# Patient Record
Sex: MALE | Race: WHITE | NOT HISPANIC OR LATINO | Employment: FULL TIME | URBAN - METROPOLITAN AREA
[De-identification: names, ages, dates, MRNs, and addresses within clinical notes are randomized per-mention and may not be internally consistent; named-entity substitution may affect disease eponyms.]

---

## 2023-03-06 ENCOUNTER — TELEPHONE (OUTPATIENT)
Dept: PRIMARY CARE | Facility: CLINIC | Age: 53
End: 2023-03-06
Payer: COMMERCIAL

## 2023-03-06 DIAGNOSIS — F17.210 CIGARETTE NICOTINE DEPENDENCE WITHOUT COMPLICATION: Primary | ICD-10-CM

## 2023-03-07 RX ORDER — IBUPROFEN 200 MG
1 TABLET ORAL EVERY 24 HOURS
Qty: 30 PATCH | Refills: 0 | Status: SHIPPED | OUTPATIENT
Start: 2023-03-07 | End: 2023-04-19 | Stop reason: SDUPTHER

## 2023-04-19 DIAGNOSIS — F17.210 CIGARETTE NICOTINE DEPENDENCE WITHOUT COMPLICATION: ICD-10-CM

## 2023-04-21 RX ORDER — IBUPROFEN 200 MG
1 TABLET ORAL EVERY 24 HOURS
Qty: 30 PATCH | Refills: 0 | Status: SHIPPED | OUTPATIENT
Start: 2023-04-21 | End: 2023-05-30 | Stop reason: SDUPTHER

## 2023-05-30 DIAGNOSIS — F17.210 CIGARETTE NICOTINE DEPENDENCE WITHOUT COMPLICATION: ICD-10-CM

## 2023-05-30 RX ORDER — IBUPROFEN 200 MG
1 TABLET ORAL EVERY 24 HOURS
Qty: 30 PATCH | Refills: 0 | Status: SHIPPED | OUTPATIENT
Start: 2023-05-30 | End: 2023-07-11 | Stop reason: ALTCHOICE

## 2023-06-27 DIAGNOSIS — R91.8 PULMONARY NODULES: Primary | ICD-10-CM

## 2023-07-10 PROBLEM — I10 HYPERTENSION: Status: ACTIVE | Noted: 2023-07-10

## 2023-07-10 PROBLEM — N40.0 ENLARGED PROSTATE WITHOUT LOWER URINARY TRACT SYMPTOMS (LUTS): Status: ACTIVE | Noted: 2023-07-10

## 2023-07-10 PROBLEM — E78.6 LOW HDL (UNDER 40): Status: ACTIVE | Noted: 2023-07-10

## 2023-07-10 PROBLEM — G81.94 HEMIPARESIS, LEFT (MULTI): Status: ACTIVE | Noted: 2023-07-10

## 2023-07-10 PROBLEM — R06.2 WHEEZING: Status: ACTIVE | Noted: 2023-07-10

## 2023-07-10 PROBLEM — E78.5 HYPERLIPIDEMIA: Status: ACTIVE | Noted: 2023-07-10

## 2023-07-10 PROBLEM — I48.91 ATRIAL FIBRILLATION (MULTI): Status: ACTIVE | Noted: 2023-07-10

## 2023-07-10 PROBLEM — N52.9 MALE ERECTILE DISORDER OF ORGANIC ORIGIN: Status: ACTIVE | Noted: 2023-07-10

## 2023-07-10 PROBLEM — I63.9: Status: ACTIVE | Noted: 2023-07-10

## 2023-07-10 PROBLEM — I63.9 CVA (CEREBRAL VASCULAR ACCIDENT) (MULTI): Status: ACTIVE | Noted: 2023-07-10

## 2023-07-10 PROBLEM — R26.89 IMBALANCE: Status: ACTIVE | Noted: 2023-07-10

## 2023-07-10 PROBLEM — F32.A DEPRESSION: Status: ACTIVE | Noted: 2023-07-10

## 2023-07-10 RX ORDER — ATORVASTATIN CALCIUM 20 MG/1
1 TABLET, FILM COATED ORAL DAILY
COMMUNITY
Start: 2021-06-21 | End: 2023-07-11 | Stop reason: ALTCHOICE

## 2023-07-10 RX ORDER — ATORVASTATIN CALCIUM 40 MG/1
40 TABLET, FILM COATED ORAL DAILY
COMMUNITY
End: 2023-07-11

## 2023-07-10 RX ORDER — LISINOPRIL 5 MG/1
5 TABLET ORAL DAILY
COMMUNITY
End: 2023-07-11 | Stop reason: ALTCHOICE

## 2023-07-10 RX ORDER — TADALAFIL 20 MG/1
TABLET ORAL
COMMUNITY
Start: 2022-05-31 | End: 2024-04-02 | Stop reason: ALTCHOICE

## 2023-07-10 RX ORDER — LISINOPRIL 10 MG/1
10 TABLET ORAL DAILY
COMMUNITY
End: 2023-07-11 | Stop reason: ALTCHOICE

## 2023-07-10 RX ORDER — FOLIC ACID 1 MG/1
1 TABLET ORAL DAILY
COMMUNITY
End: 2024-02-12 | Stop reason: SDUPTHER

## 2023-07-10 RX ORDER — METOPROLOL SUCCINATE 50 MG/1
50 TABLET, EXTENDED RELEASE ORAL DAILY
COMMUNITY

## 2023-07-10 RX ORDER — APIXABAN 5 MG/1
5 TABLET, FILM COATED ORAL 2 TIMES DAILY
COMMUNITY
End: 2024-04-02 | Stop reason: SDUPTHER

## 2023-07-10 RX ORDER — ASPIRIN 81 MG/1
81 TABLET ORAL DAILY
COMMUNITY

## 2023-07-10 RX ORDER — LANOLIN ALCOHOL/MO/W.PET/CERES
1 CREAM (GRAM) TOPICAL DAILY
COMMUNITY
Start: 2021-06-14 | End: 2023-10-11 | Stop reason: ALTCHOICE

## 2023-07-10 RX ORDER — DM/P-EPHED/ACETAMINOPH/DOXYLAM 30-7.5/3
LIQUID (ML) ORAL
COMMUNITY
Start: 2022-03-23 | End: 2023-10-11 | Stop reason: ALTCHOICE

## 2023-07-11 ENCOUNTER — OFFICE VISIT (OUTPATIENT)
Dept: PRIMARY CARE | Facility: CLINIC | Age: 53
End: 2023-07-11
Payer: COMMERCIAL

## 2023-07-11 VITALS
HEART RATE: 82 BPM | TEMPERATURE: 97 F | WEIGHT: 240 LBS | DIASTOLIC BLOOD PRESSURE: 65 MMHG | SYSTOLIC BLOOD PRESSURE: 148 MMHG | BODY MASS INDEX: 29.22 KG/M2 | HEIGHT: 76 IN

## 2023-07-11 DIAGNOSIS — R91.8 PULMONARY NODULES: ICD-10-CM

## 2023-07-11 DIAGNOSIS — F17.219 CIGARETTE NICOTINE DEPENDENCE WITH NICOTINE-INDUCED DISORDER: ICD-10-CM

## 2023-07-11 DIAGNOSIS — I48.91 ATRIAL FIBRILLATION, UNSPECIFIED TYPE (MULTI): ICD-10-CM

## 2023-07-11 DIAGNOSIS — I10 HYPERTENSION, UNSPECIFIED TYPE: Primary | ICD-10-CM

## 2023-07-11 DIAGNOSIS — E78.5 HYPERLIPIDEMIA, UNSPECIFIED HYPERLIPIDEMIA TYPE: ICD-10-CM

## 2023-07-11 DIAGNOSIS — M54.12 CERVICAL RADICULOPATHY: ICD-10-CM

## 2023-07-11 PROCEDURE — 99214 OFFICE O/P EST MOD 30 MIN: CPT | Performed by: PEDIATRICS

## 2023-07-11 PROCEDURE — 90471 IMMUNIZATION ADMIN: CPT | Performed by: PEDIATRICS

## 2023-07-11 PROCEDURE — 3077F SYST BP >= 140 MM HG: CPT | Performed by: PEDIATRICS

## 2023-07-11 PROCEDURE — 90750 HZV VACC RECOMBINANT IM: CPT | Performed by: PEDIATRICS

## 2023-07-11 PROCEDURE — 3078F DIAST BP <80 MM HG: CPT | Performed by: PEDIATRICS

## 2023-07-11 PROCEDURE — 1036F TOBACCO NON-USER: CPT | Performed by: PEDIATRICS

## 2023-07-11 RX ORDER — ROSUVASTATIN CALCIUM 40 MG/1
40 TABLET, COATED ORAL DAILY
COMMUNITY
Start: 2023-06-27

## 2023-07-11 RX ORDER — NICOTINE 7MG/24HR
1 PATCH, TRANSDERMAL 24 HOURS TRANSDERMAL EVERY 24 HOURS
Qty: 30 PATCH | Refills: 0 | Status: SHIPPED | OUTPATIENT
Start: 2023-07-11 | End: 2023-08-15 | Stop reason: SDUPTHER

## 2023-07-11 RX ORDER — LISINOPRIL 20 MG/1
20 TABLET ORAL DAILY
Qty: 30 TABLET | Refills: 5 | Status: SHIPPED | OUTPATIENT
Start: 2023-07-11 | End: 2024-01-17 | Stop reason: SDUPTHER

## 2023-07-11 NOTE — PROGRESS NOTES
"Subjective   Patient ID: Adi Tavera is a 53 y.o. male who presents for check up.    HPI   Patient stopped smoking January.  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 Currently he has right upper back pain/shoulder pain and tingling down right arm.  PSA ok  Colonoscopy due 2024  Review of Systems    Objective   /65   Pulse 82   Temp 36.1 °C (97 °F)   Ht 1.93 m (6' 4\")   Wt 109 kg (240 lb)   BMI 29.21 kg/m²     Physical Exam  HEENT neg  Lungs clear  Heart reg  Abd soft and nontender  No edema  Strength ok  ROM ok  Assessment/Plan   Problem List Items Addressed This Visit       Pulmonary nodules     Nodules noted; repeat CT next year         Atrial fibrillation (CMS/HCC)     Continues on Metoprolol and Eliquis         Relevant Medications    metoprolol succinate XL (Toprol-XL) 50 mg 24 hr tablet    tadalafil 20 mg tablet    Hyperlipidemia     Continue Rosuvastatin 40         Hypertension - Primary     Increase Lisinopril to 40         Cervical radiculopathy   Shingrix today       "

## 2023-08-15 DIAGNOSIS — F17.219 CIGARETTE NICOTINE DEPENDENCE WITH NICOTINE-INDUCED DISORDER: ICD-10-CM

## 2023-08-15 RX ORDER — NICOTINE 7MG/24HR
1 PATCH, TRANSDERMAL 24 HOURS TRANSDERMAL EVERY 24 HOURS
Qty: 30 PATCH | Refills: 1 | Status: SHIPPED | OUTPATIENT
Start: 2023-08-15 | End: 2024-04-02

## 2023-10-11 ENCOUNTER — OFFICE VISIT (OUTPATIENT)
Dept: PRIMARY CARE | Facility: CLINIC | Age: 53
End: 2023-10-11
Payer: COMMERCIAL

## 2023-10-11 VITALS
DIASTOLIC BLOOD PRESSURE: 77 MMHG | SYSTOLIC BLOOD PRESSURE: 124 MMHG | HEART RATE: 74 BPM | BODY MASS INDEX: 29.1 KG/M2 | HEIGHT: 76 IN | WEIGHT: 239 LBS | TEMPERATURE: 96.9 F | RESPIRATION RATE: 17 BRPM | OXYGEN SATURATION: 94 %

## 2023-10-11 DIAGNOSIS — I63.9 CEREBROVASCULAR ACCIDENT (CVA), UNSPECIFIED MECHANISM (MULTI): ICD-10-CM

## 2023-10-11 DIAGNOSIS — E78.5 HYPERLIPIDEMIA, UNSPECIFIED HYPERLIPIDEMIA TYPE: ICD-10-CM

## 2023-10-11 DIAGNOSIS — I10 HYPERTENSION, UNSPECIFIED TYPE: Primary | ICD-10-CM

## 2023-10-11 PROBLEM — R26.89 IMBALANCE: Status: RESOLVED | Noted: 2023-07-10 | Resolved: 2023-10-11

## 2023-10-11 PROCEDURE — 1036F TOBACCO NON-USER: CPT | Performed by: PEDIATRICS

## 2023-10-11 PROCEDURE — 99213 OFFICE O/P EST LOW 20 MIN: CPT | Performed by: PEDIATRICS

## 2023-10-11 PROCEDURE — 3074F SYST BP LT 130 MM HG: CPT | Performed by: PEDIATRICS

## 2023-10-11 PROCEDURE — 3078F DIAST BP <80 MM HG: CPT | Performed by: PEDIATRICS

## 2023-10-11 RX ORDER — SULFAMETHOXAZOLE AND TRIMETHOPRIM 800; 160 MG/1; MG/1
1 TABLET ORAL 2 TIMES DAILY
COMMUNITY
Start: 2023-09-26 | End: 2023-10-06

## 2023-10-11 RX ORDER — MUPIROCIN 20 MG/G
OINTMENT TOPICAL
COMMUNITY
Start: 2023-09-26 | End: 2023-10-11 | Stop reason: ALTCHOICE

## 2023-10-11 ASSESSMENT — PATIENT HEALTH QUESTIONNAIRE - PHQ9
2. FEELING DOWN, DEPRESSED OR HOPELESS: NOT AT ALL
SUM OF ALL RESPONSES TO PHQ9 QUESTIONS 1 AND 2: 0
1. LITTLE INTEREST OR PLEASURE IN DOING THINGS: NOT AT ALL

## 2023-10-11 ASSESSMENT — ENCOUNTER SYMPTOMS: HYPERTENSION: 1

## 2023-10-11 ASSESSMENT — PAIN SCALES - GENERAL: PAINLEVEL: 0-NO PAIN

## 2023-10-11 NOTE — PATIENT INSTRUCTIONS
Egg white omelet or cereal for breakfast;  Avoid fried foods unless using air fryer;  Limit starches  Bike half hour daily; exercise videos Sona Roman  Congratulations on quitting smoking and drinking.  See Dr Aaron  See me in 6 months

## 2023-10-11 NOTE — PROGRESS NOTES
"Subjective   Patient ID: Roverto Tavera is a 53 y.o. male who presents for Hypertension and Hyperlipidemia.    Hypertension    Hyperlipidemia       Patient lives alone. Takes son out to dinner once a week;  Breakfast: sausage and gravy;  Lunch: left overs; not too much lunch meat  Dinner: steak, or pork, or fried chicken; veggies; trying to limit bread  Fast food twice a week;   Snack: rarely  Colonoscopy due 2024  No smoking or alcohol  Review of Systems    Objective   /77 (BP Location: Right arm, Patient Position: Sitting, BP Cuff Size: Adult)   Pulse 74   Temp 36.1 °C (96.9 °F) (Temporal)   Resp 17   Ht 1.93 m (6' 4\")   Wt 108 kg (239 lb)   SpO2 94%   BMI 29.09 kg/m²     Physical Exam  Vitals reviewed.   Constitutional:       General: He is not in acute distress.  HENT:      Head: Normocephalic.      Right Ear: Tympanic membrane normal.      Left Ear: Tympanic membrane normal.      Nose: Nose normal.      Mouth/Throat:      Pharynx: Oropharynx is clear.   Cardiovascular:      Rate and Rhythm: Normal rate and regular rhythm.      Heart sounds: Normal heart sounds.   Pulmonary:      Breath sounds: Normal breath sounds.   Abdominal:      Palpations: Abdomen is soft.      Tenderness: There is no abdominal tenderness.   Musculoskeletal:         General: No tenderness.   Skin:     Findings: No rash.   Neurological:      General: No focal deficit present.      Mental Status: He is alert.   Psychiatric:         Mood and Affect: Mood normal.         Assessment/Plan   Problem List Items Addressed This Visit       CVA (cerebral vascular accident) (CMS/[facility])    Current Assessment & Plan     Feels he has pretty much regained his left sided strength         Hyperlipidemia    Current Assessment & Plan     LDL 86 in January; continue Rosuva         Hypertension - Primary    Current Assessment & Plan     Continue lisinopril 20                "

## 2023-10-12 PROBLEM — G81.94 HEMIPARESIS, LEFT (MULTI): Status: RESOLVED | Noted: 2023-07-10 | Resolved: 2023-10-12

## 2024-01-17 DIAGNOSIS — I10 HYPERTENSION, UNSPECIFIED TYPE: ICD-10-CM

## 2024-01-18 RX ORDER — LISINOPRIL 20 MG/1
20 TABLET ORAL DAILY
Qty: 90 TABLET | Refills: 0 | Status: SHIPPED | OUTPATIENT
Start: 2024-01-18 | End: 2024-04-17 | Stop reason: SDUPTHER

## 2024-02-12 DIAGNOSIS — Z00.00 HEALTH CARE MAINTENANCE: Primary | ICD-10-CM

## 2024-02-13 RX ORDER — FOLIC ACID 1 MG/1
1 TABLET ORAL DAILY
Qty: 90 TABLET | Refills: 1 | Status: SHIPPED | OUTPATIENT
Start: 2024-02-13

## 2024-04-02 ENCOUNTER — SPECIALTY PHARMACY (OUTPATIENT)
Dept: PHARMACY | Facility: CLINIC | Age: 54
End: 2024-04-02

## 2024-04-02 ENCOUNTER — OFFICE VISIT (OUTPATIENT)
Dept: CARDIOLOGY | Facility: CLINIC | Age: 54
End: 2024-04-02
Payer: COMMERCIAL

## 2024-04-02 VITALS
SYSTOLIC BLOOD PRESSURE: 157 MMHG | WEIGHT: 251.7 LBS | OXYGEN SATURATION: 96 % | DIASTOLIC BLOOD PRESSURE: 78 MMHG | BODY MASS INDEX: 30.65 KG/M2 | HEIGHT: 76 IN | HEART RATE: 76 BPM

## 2024-04-02 DIAGNOSIS — I48.91 ATRIAL FIBRILLATION, UNSPECIFIED TYPE (MULTI): ICD-10-CM

## 2024-04-02 DIAGNOSIS — I48.91 ATRIAL FIBRILLATION, UNSPECIFIED TYPE (MULTI): Primary | ICD-10-CM

## 2024-04-02 PROCEDURE — 99214 OFFICE O/P EST MOD 30 MIN: CPT | Performed by: NURSE PRACTITIONER

## 2024-04-02 PROCEDURE — 3078F DIAST BP <80 MM HG: CPT | Performed by: NURSE PRACTITIONER

## 2024-04-02 PROCEDURE — 1036F TOBACCO NON-USER: CPT | Performed by: NURSE PRACTITIONER

## 2024-04-02 PROCEDURE — 3077F SYST BP >= 140 MM HG: CPT | Performed by: NURSE PRACTITIONER

## 2024-04-02 RX ORDER — APIXABAN 5 MG/1
5 TABLET, FILM COATED ORAL 2 TIMES DAILY
Qty: 180 TABLET | Refills: 3 | Status: SHIPPED | OUTPATIENT
Start: 2024-04-02 | End: 2024-05-13 | Stop reason: SDUPTHER

## 2024-04-02 RX ORDER — APIXABAN 5 MG/1
5 TABLET, FILM COATED ORAL 2 TIMES DAILY
Qty: 180 TABLET | Refills: 1 | Status: SHIPPED | OUTPATIENT
Start: 2024-04-02 | End: 2024-04-02 | Stop reason: SDUPTHER

## 2024-04-02 ASSESSMENT — ENCOUNTER SYMPTOMS
DEPRESSION: 0
LOSS OF SENSATION IN FEET: 0
OCCASIONAL FEELINGS OF UNSTEADINESS: 0

## 2024-04-02 NOTE — PROGRESS NOTES
"Chief Complaint:   Follow-up    History Of Present Illness:    .Mr Tavera returns in follow up.  Denies chest pain, sob, palpitations or pedal edema.  Dr Carlin will draw labs. He has what sounds like a loop recorder, but he had to cancel the service because it was \"out of network\".  Would like to investigate resuming service, so will have him see EP.           Last Recorded Vitals:  Blood pressure 157/78, pulse 76, height 1.93 m (6' 4\"), weight 114 kg (251 lb 11.2 oz), SpO2 96 %.     Past Medical History:  Past Medical History:   Diagnosis Date    Abnormal findings on diagnostic imaging of other specified body structures 05/22/2021    Abnormal chest xray    Diverticulitis of intestine, part unspecified, without perforation or abscess without bleeding 03/23/2021    Acute diverticulitis    Malignant neoplasm of unspecified kidney, except renal pelvis (CMS/HCC) 05/29/2013    Malignant neoplasm of kidney    Personal history of other diseases of the respiratory system 05/22/2021    History of interstitial lung disease    Personal history of other malignant neoplasm of kidney 08/18/2017    History of renal cell carcinoma    Personal history of other mental and behavioral disorders 07/12/2021    History of anxiety        Past Surgical History:  Past Surgical History:   Procedure Laterality Date    FOOT SURGERY  05/30/2013    Foot Surgery    KIDNEY SURGERY  05/30/2013    Kidney Surgery    NECK SURGERY  05/30/2013    Neck Surgery       Social History:  Social History     Socioeconomic History    Marital status: Single     Spouse name: None    Number of children: None    Years of education: None    Highest education level: None   Occupational History    None   Tobacco Use    Smoking status: Former     Types: Cigarettes    Smokeless tobacco: Never   Substance and Sexual Activity    Alcohol use: Not Currently    Drug use: Never    Sexual activity: None   Other Topics Concern    None   Social History Narrative    None     Social " Determinants of Health     Financial Resource Strain: Not on file   Food Insecurity: Not on file   Transportation Needs: Not on file   Physical Activity: Not on file   Stress: Not on file   Social Connections: Not on file   Intimate Partner Violence: Not on file   Housing Stability: Not on file       Family History:  No family history on file.      Allergies:  Patient has no known allergies.    Outpatient Medications:  Current Outpatient Medications   Medication Sig Dispense Refill    aspirin 81 mg EC tablet Take 1 tablet (81 mg) by mouth once daily.      Eliquis 5 mg tablet Take 1 tablet (5 mg) by mouth 2 times a day.      folic acid (Folvite) 1 mg tablet Take 1 tablet (1 mg) by mouth once daily. 90 tablet 1    lisinopril 20 mg tablet Take 1 tablet (20 mg) by mouth once daily. 90 tablet 0    metoprolol succinate XL (Toprol-XL) 50 mg 24 hr tablet Take 1 tablet (50 mg) by mouth once daily.      rosuvastatin (Crestor) 40 mg tablet Take 1 tablet (40 mg) by mouth once daily.       No current facility-administered medications for this visit.        Physical Exam:  Physical Exam    ROS:  ROS       Last Labs:  CBC -  Lab Results   Component Value Date    WBC 10.1 01/30/2023    HGB 15.4 01/30/2023    HCT 45.6 01/30/2023    MCV 97 01/30/2023     01/30/2023       CMP -  Lab Results   Component Value Date    CALCIUM 9.9 01/30/2023    PROT 7.5 06/17/2021    ALBUMIN 4.6 06/17/2021    AST 20 06/17/2021    ALT 24 01/30/2023    ALKPHOS 119 06/17/2021    BILITOT 0.6 06/17/2021       LIPID PANEL -   Lab Results   Component Value Date    CHOL 180 01/30/2023    TRIG 220 (H) 01/30/2023    HDL 50.5 01/30/2023    CHHDL 3.6 01/30/2023    LDLF 86 01/30/2023    VLDL 44 (H) 01/30/2023    NHDL 130 01/30/2023       RENAL FUNCTION PANEL -   Lab Results   Component Value Date    GLUCOSE 102 (H) 01/30/2023     01/30/2023    K 4.8 01/30/2023     01/30/2023    CO2 24 01/30/2023    ANIONGAP 15 01/30/2023    BUN 20 01/30/2023     CREATININE 1.00 01/30/2023    GFRMALE 89 01/30/2023    CALCIUM 9.9 01/30/2023    ALBUMIN 4.6 06/17/2021        Lab Results   Component Value Date    HGBA1C 5.3 06/07/2021         Assessment/Plan   Problem List Items Addressed This Visit    None  Assessment:     1. Status post right hemispheric CVA. The patient is a 51-year-old white male with a history of longstanding hypertension hyperlipidemia chronic smoking of 1-1-1/2 packs/day with remote substance abuse including marijuana and cocaine. He also has a history of ongoing alcohol abuse. The patient was actually admitted to [facility] and 5/2021 for detoxification. The patient subsequently was referred to a detoxification facility at [facility]. The patient was in the midst of the program when he developed a right hemispheric CVA manifested by a left facial droop slurred speech and the paresthesias of the left-sided extremities that occurred on 6/6/2021. The patient was admitted for a period of 1 week to the [facility]. Reportedly he was identified as having paroxysmal atrial fibrillation. Loop recorder was implanted and he was started on Eliquis 5 mg twice daily. After his 1 week hospitalization he was sent to the [facility] for rehabilitation and ultimately was released on 6/21/2021. The patient presents for initial neurologic cardiology follow-up. His EKG today demonstrates sinus rhythm with moderate voltage criteria for left we will attempt to obtain the records from the [facility] the patient is presently taking clonidine only 0.1 mg daily. And will be discontinued in favor of Toprol-XL 50 mg daily. He will also be advised to restart lisinopril 10 mg daily for hypertension and will be aware of the fact that the patient solitary kidney. The patient will be reminded to restart previously prescribed atorvastatin 40 mg daily. Apparently patient has a loop recorder without current monitoring.  Will refer to EP to re  institute monitoring.     2. Paroxysmal atrial fibrillation. See discussion above. Obtain records from [facility].     3. Eliquis anticoagulation.     4. Hypertension. Blood pressure is acceptable today. We will continue observation now on lisinopril 10 mg daily Toprol-XL 50 mg daily     5. Hyperlipidemia. Was on atorvastatin 40 mg daily. Now on rosuvastatin 40 mg daily and pcp will draw fasting labs.     6. History of chronic smoking. Quit 3 months ago.      7 history of alcohol abuse.     8. Status post left-sided nephrectomy for renal cell carcinoma 2004.     9. CAD. Coronary artery calcium score done 06/2023 was 162.08.  Stress echo done 07/2023  Summary:  1. The resting ejection fraction was estimated at 55 to 60% with a peak exercise ejection fraction estimated at 60 to 65%.  2. No clinical, electrocardiographic or echocardiographic evidence for ischemia at a maximal workload.  3. Submaximal level of stress achieved.              Callie Ortega, APRN-CNP

## 2024-04-11 ENCOUNTER — OFFICE VISIT (OUTPATIENT)
Dept: PRIMARY CARE | Facility: CLINIC | Age: 54
End: 2024-04-11
Payer: COMMERCIAL

## 2024-04-11 VITALS
HEART RATE: 82 BPM | BODY MASS INDEX: 30.69 KG/M2 | HEIGHT: 76 IN | DIASTOLIC BLOOD PRESSURE: 70 MMHG | OXYGEN SATURATION: 95 % | WEIGHT: 252 LBS | TEMPERATURE: 96.9 F | SYSTOLIC BLOOD PRESSURE: 111 MMHG

## 2024-04-11 DIAGNOSIS — E78.5 HYPERLIPIDEMIA, UNSPECIFIED HYPERLIPIDEMIA TYPE: ICD-10-CM

## 2024-04-11 DIAGNOSIS — Z12.11 COLON CANCER SCREENING: ICD-10-CM

## 2024-04-11 DIAGNOSIS — I48.0 PAROXYSMAL ATRIAL FIBRILLATION (MULTI): ICD-10-CM

## 2024-04-11 DIAGNOSIS — Z12.5 SCREENING PSA (PROSTATE SPECIFIC ANTIGEN): ICD-10-CM

## 2024-04-11 DIAGNOSIS — I10 HYPERTENSION, UNSPECIFIED TYPE: ICD-10-CM

## 2024-04-11 DIAGNOSIS — N40.0 ENLARGED PROSTATE WITHOUT LOWER URINARY TRACT SYMPTOMS (LUTS): ICD-10-CM

## 2024-04-11 DIAGNOSIS — I48.91 ATRIAL FIBRILLATION, UNSPECIFIED TYPE (MULTI): ICD-10-CM

## 2024-04-11 DIAGNOSIS — I63.9 CEREBROVASCULAR ACCIDENT (CVA), UNSPECIFIED MECHANISM (MULTI): ICD-10-CM

## 2024-04-11 DIAGNOSIS — Z00.00 WELL ADULT EXAM: Primary | ICD-10-CM

## 2024-04-11 DIAGNOSIS — Z87.891 FORMER SMOKER: ICD-10-CM

## 2024-04-11 DIAGNOSIS — E66.09 CLASS 1 OBESITY DUE TO EXCESS CALORIES WITH SERIOUS COMORBIDITY AND BODY MASS INDEX (BMI) OF 30.0 TO 30.9 IN ADULT: ICD-10-CM

## 2024-04-11 PROBLEM — E66.811 CLASS 1 OBESITY DUE TO EXCESS CALORIES WITH SERIOUS COMORBIDITY AND BODY MASS INDEX (BMI) OF 30.0 TO 30.9 IN ADULT: Status: ACTIVE | Noted: 2024-04-11

## 2024-04-11 PROBLEM — M54.12 CERVICAL RADICULOPATHY: Status: RESOLVED | Noted: 2023-07-11 | Resolved: 2024-04-11

## 2024-04-11 PROCEDURE — 3078F DIAST BP <80 MM HG: CPT | Performed by: PEDIATRICS

## 2024-04-11 PROCEDURE — 3008F BODY MASS INDEX DOCD: CPT | Performed by: PEDIATRICS

## 2024-04-11 PROCEDURE — 99396 PREV VISIT EST AGE 40-64: CPT | Performed by: PEDIATRICS

## 2024-04-11 PROCEDURE — 3074F SYST BP LT 130 MM HG: CPT | Performed by: PEDIATRICS

## 2024-04-11 NOTE — PROGRESS NOTES
"Subjective   Patient ID: Roverto Tavera is a 54 y.o. male who presents for check up.    HPI   Patient has been feeling well, no complaints.  Patient notes that he quit smoking 1 year ago using a nicotine patch. He smoked for 40 years, about a 1.5 pack a day.  Patient stopped drinking EtOH for a few years.  Patient denies any chest pain, SOB, or palpitations.  Saw cardiologist 10 days ago.  Works as a , walks a significant amount.    Last colonoscopy was 2021. Polyps were removed and found to be benign. Needs repeat this year  Prostate antigen was January 2023, normal.  Cardiac stress test was in July 2023, submaximal level stress achieved.    Denies aches and pains    Review of Systems    Objective   /70   Pulse 82   Temp 36.1 °C (96.9 °F)   Ht 1.93 m (6' 4\")   Wt 114 kg (252 lb)   SpO2 95%   BMI 30.67 kg/m²     Physical Exam  General: Well-appearing, NAD  Cardiovascular: RRR  Lungs: CTA bilaterally, normal breath sounds  Abdomen: Soft nontender; small umbilical hernia      Assessment/Plan   Problem List Items Addressed This Visit             ICD-10-CM    Paroxysmal atrial fibrillation (Multi) I48.0     Currently taking metoprolol. S/p cardiac implant.         CVA (cerebral vascular accident) (Multi) I63.9     Stroke was around 3 years ago, currently taking eliquis and aspirin with well-controlled blood pressure.         Enlarged prostate without lower urinary tract symptoms (luts) N40.0    Hyperlipidemia E78.5     Taking rosuvastatin. Last lipid panel was January 2023, total cholesterol and LDL were good. Triglycerides were 220.         Relevant Orders    Lipid Panel    Hypertension I10     Well controlled on lisinopril.         Class 1 obesity due to excess calories with serious comorbidity and body mass index (BMI) of 30.0 to 30.9 in adult E66.09, Z68.30     Does not exercise  Breakfast bowl for breakfast--potato and cheese  Lunch--[employer] TV dinner with beef and " pasta  Dinner--chicken, salad  Snack: not much  Drinks water         Relevant Orders    Hemoglobin A1C    Comprehensive Metabolic Panel    Vitamin D 25-Hydroxy,Total (for eval of Vitamin D levels)     Other Visit Diagnoses         Codes    Well adult exam    -  Primary Z00.00    Colon cancer screening     Z12.11    Relevant Orders    Colonoscopy Screening; High Risk Patient    Former smoker     Z87.891    Relevant Orders    CT lung screening low dose    Screening PSA (prostate specific antigen)     Z12.5    Relevant Orders    Prostate Specific Antigen

## 2024-04-11 NOTE — ASSESSMENT & PLAN NOTE
Does not exercise  Breakfast bowl for breakfast--potato and cheese  Lunch--[employer] TV dinner with beef and pasta  Dinner--chicken, salad  Snack: not much  Drinks water

## 2024-04-11 NOTE — ASSESSMENT & PLAN NOTE
Stroke was around 3 years ago, currently taking eliquis and aspirin with well-controlled blood pressure.

## 2024-04-11 NOTE — ASSESSMENT & PLAN NOTE
Taking rosuvastatin. Last lipid panel was January 2023, total cholesterol and LDL were good. Triglycerides were 220.

## 2024-04-12 PROBLEM — I48.91 ATRIAL FIBRILLATION (MULTI): Status: RESOLVED | Noted: 2023-07-10 | Resolved: 2024-04-12

## 2024-04-12 PROBLEM — I48.0 PAROXYSMAL ATRIAL FIBRILLATION (MULTI): Status: ACTIVE | Noted: 2023-07-10

## 2024-04-16 PROBLEM — F14.11 COCAINE ABUSE, IN REMISSION (MULTI): Chronic | Status: ACTIVE | Noted: 2023-11-16

## 2024-04-16 PROBLEM — F10.21 ALCOHOL DEPENDENCE, IN REMISSION (MULTI): Chronic | Status: ACTIVE | Noted: 2023-11-16

## 2024-04-16 NOTE — PROGRESS NOTES
"I had the pleasure seeing Adi Tavera     Chief Complaint   Patient presents with    CVA (Cerebral Vascular Accident)    Atrial Fibrillation     OUTPATIENT CONSULTATION: Cardiac Electrophysiology  DOS: April 18, 2024  REASON: AF   REFERRING:  Callie Ortega CNP / El Aaron MD            Current Outpatient Medications   Medication Instructions    aspirin 81 mg, oral, Daily    Eliquis 5 mg, oral, 2 times daily    folic acid (FOLVITE) 1 mg, oral, Daily    lisinopril 20 mg, oral, Daily    metoprolol succinate XL (TOPROL-XL) 50 mg, oral, Daily    rosuvastatin (CRESTOR) 40 mg, oral, Daily      Adi Tavera is a 54 y.o. with:     HTN, HL  Hx of ongoing ETOH dependence and remote Cocaine abuse. 05/2021 admitted at [facility] for detoxification.   Renal Cell Carcinoma - s/p Lt nephrectomy (2004)   CAD - elevated CACS (6/2023) Total 162.08   CVA - (6/6/21) Rt hemisphere . Subsequently had a loop recorder that showed AF and was started on Eliquis.  ([facility]).   Palpitations / Paroxysmal AF - (index dx ? June 2021) s/p loop recorder implant.        TESTING:      -Stress Echo (7/3/23) Resting LVEF 55-60% and 60-65% at peak.  No evidence of ischemia on ECG or imaging.        Objective   Physical Exam  /84 (BP Location: Left arm, Patient Position: Sitting, BP Cuff Size: Large adult)   Pulse 85   Ht 1.93 m (6' 4\")   Wt 113 kg (250 lb)   SpO2 96%   BMI 30.43 kg/m²     General Appearance:  Alert, cooperative, no distress, appears stated age   Head:  Normocephalic, without obvious abnormality, atraumatic   Eyes:  PERRL, conjunctiva/corneas clear, EOM's intact, fundi benign, both eyes   Ears:  Normal TM's and external ear canals, both ears   Nose: Nares normal, septum midline, mucosa normal, no drainage or sinus tenderness   Throat: Lips, mucosa, and tongue normal; teeth and gums normal   Neck: Supple, symmetrical, trachea midline, no adenopathy, thyroid: not enlarged, symmetric, no " tenderness/mass/nodules, no carotid bruit or JVD   Back:   Symmetric, no curvature, ROM normal, no CVA tenderness   Lungs:   Clear to auscultation bilaterally, respirations unlabored   Chest Wall:  No tenderness or deformity   Heart:  Regular rate and rhythm, S1, S2 normal, no murmur, rub or gallop   Abdomen:   Soft, non-tender, bowel sounds active all four quadrants,  no masses, no organomegaly   Genitalia:  Normal male   Rectal:  Normal tone, normal prostate, no masses or tenderness;  guaiac negative stool   Extremities: Extremities normal, atraumatic, no cyanosis or edema   Pulses: 2+ and symmetric   Skin: Skin color, texture, turgor normal, no rashes or lesions   Lymph nodes: Cervical, supraclavicular, and axillary nodes normal   Neurologic: Normal           Assessment/Plan   He is s/p loop recorder Gemvara at [facility]. His loop recorder shows 8% burden of AF. I did emphasize the need for strict anticoagulation. We will monitor the AF for progression and intervene if needed.

## 2024-04-17 DIAGNOSIS — I10 HYPERTENSION, UNSPECIFIED TYPE: ICD-10-CM

## 2024-04-18 ENCOUNTER — OFFICE VISIT (OUTPATIENT)
Dept: CARDIOLOGY | Facility: CLINIC | Age: 54
End: 2024-04-18
Payer: COMMERCIAL

## 2024-04-18 ENCOUNTER — HOSPITAL ENCOUNTER (OUTPATIENT)
Dept: CARDIOLOGY | Facility: CLINIC | Age: 54
Discharge: HOME | End: 2024-04-18
Payer: COMMERCIAL

## 2024-04-18 VITALS
SYSTOLIC BLOOD PRESSURE: 144 MMHG | HEIGHT: 76 IN | BODY MASS INDEX: 30.44 KG/M2 | WEIGHT: 250 LBS | DIASTOLIC BLOOD PRESSURE: 84 MMHG | HEART RATE: 85 BPM | OXYGEN SATURATION: 96 %

## 2024-04-18 DIAGNOSIS — I50.22 CHRONIC SYSTOLIC CONGESTIVE HEART FAILURE (MULTI): ICD-10-CM

## 2024-04-18 DIAGNOSIS — I51.9 CARDIOPATHY: Primary | ICD-10-CM

## 2024-04-18 DIAGNOSIS — I48.0 PAROXYSMAL ATRIAL FIBRILLATION (MULTI): ICD-10-CM

## 2024-04-18 DIAGNOSIS — I51.9 CARDIOPATHY: ICD-10-CM

## 2024-04-18 PROCEDURE — 3008F BODY MASS INDEX DOCD: CPT | Performed by: INTERNAL MEDICINE

## 2024-04-18 PROCEDURE — 3077F SYST BP >= 140 MM HG: CPT | Performed by: INTERNAL MEDICINE

## 2024-04-18 PROCEDURE — 93291 INTERROG DEV EVAL SCRMS IP: CPT

## 2024-04-18 PROCEDURE — 3079F DIAST BP 80-89 MM HG: CPT | Performed by: INTERNAL MEDICINE

## 2024-04-18 PROCEDURE — 93291 INTERROG DEV EVAL SCRMS IP: CPT | Performed by: INTERNAL MEDICINE

## 2024-04-18 PROCEDURE — 99214 OFFICE O/P EST MOD 30 MIN: CPT | Performed by: INTERNAL MEDICINE

## 2024-04-18 PROCEDURE — 93010 ELECTROCARDIOGRAM REPORT: CPT | Performed by: INTERNAL MEDICINE

## 2024-04-18 PROCEDURE — 93005 ELECTROCARDIOGRAM TRACING: CPT | Mod: 59 | Performed by: INTERNAL MEDICINE

## 2024-04-18 RX ORDER — LISINOPRIL 20 MG/1
20 TABLET ORAL DAILY
Qty: 90 TABLET | Refills: 1 | Status: SHIPPED | OUTPATIENT
Start: 2024-04-18 | End: 2024-10-15

## 2024-04-18 ASSESSMENT — ENCOUNTER SYMPTOMS
LOSS OF SENSATION IN FEET: 0
DEPRESSION: 0
OCCASIONAL FEELINGS OF UNSTEADINESS: 0

## 2024-04-18 ASSESSMENT — LIFESTYLE VARIABLES
SKIP TO QUESTIONS 9-10: 1
HOW MANY STANDARD DRINKS CONTAINING ALCOHOL DO YOU HAVE ON A TYPICAL DAY: PATIENT DOES NOT DRINK
HOW OFTEN DO YOU HAVE A DRINK CONTAINING ALCOHOL: NEVER
AUDIT-C TOTAL SCORE: 0
HOW OFTEN DO YOU HAVE SIX OR MORE DRINKS ON ONE OCCASION: NEVER

## 2024-04-19 ENCOUNTER — LAB (OUTPATIENT)
Dept: LAB | Facility: LAB | Age: 54
End: 2024-04-19
Payer: COMMERCIAL

## 2024-04-19 DIAGNOSIS — E66.09 CLASS 1 OBESITY DUE TO EXCESS CALORIES WITH SERIOUS COMORBIDITY AND BODY MASS INDEX (BMI) OF 30.0 TO 30.9 IN ADULT: ICD-10-CM

## 2024-04-19 DIAGNOSIS — I48.91 ATRIAL FIBRILLATION, UNSPECIFIED TYPE (MULTI): ICD-10-CM

## 2024-04-19 DIAGNOSIS — E78.5 HYPERLIPIDEMIA, UNSPECIFIED HYPERLIPIDEMIA TYPE: ICD-10-CM

## 2024-04-19 DIAGNOSIS — Z12.5 SCREENING PSA (PROSTATE SPECIFIC ANTIGEN): ICD-10-CM

## 2024-04-19 LAB
25(OH)D3 SERPL-MCNC: 17 NG/ML (ref 30–100)
ALBUMIN SERPL BCP-MCNC: 4.1 G/DL (ref 3.4–5)
ALP SERPL-CCNC: 87 U/L (ref 33–120)
ALT SERPL W P-5'-P-CCNC: 31 U/L (ref 10–52)
ANION GAP SERPL CALC-SCNC: 13 MMOL/L (ref 10–20)
AST SERPL W P-5'-P-CCNC: 20 U/L (ref 9–39)
BILIRUB SERPL-MCNC: 0.6 MG/DL (ref 0–1.2)
BUN SERPL-MCNC: 17 MG/DL (ref 6–23)
CALCIUM SERPL-MCNC: 9.3 MG/DL (ref 8.6–10.6)
CHLORIDE SERPL-SCNC: 107 MMOL/L (ref 98–107)
CHOLEST SERPL-MCNC: 105 MG/DL (ref 0–199)
CHOLESTEROL/HDL RATIO: 2.2
CO2 SERPL-SCNC: 25 MMOL/L (ref 21–32)
CREAT SERPL-MCNC: 0.87 MG/DL (ref 0.5–1.3)
EGFRCR SERPLBLD CKD-EPI 2021: >90 ML/MIN/1.73M*2
ERYTHROCYTE [DISTWIDTH] IN BLOOD BY AUTOMATED COUNT: 13.3 % (ref 11.5–14.5)
EST. AVERAGE GLUCOSE BLD GHB EST-MCNC: 126 MG/DL
GLUCOSE SERPL-MCNC: 96 MG/DL (ref 74–99)
HBA1C MFR BLD: 6 %
HCT VFR BLD AUTO: 43 % (ref 41–52)
HDLC SERPL-MCNC: 46.7 MG/DL
HGB BLD-MCNC: 14.4 G/DL (ref 13.5–17.5)
LDLC SERPL CALC-MCNC: 40 MG/DL
MCH RBC QN AUTO: 30.3 PG (ref 26–34)
MCHC RBC AUTO-ENTMCNC: 33.5 G/DL (ref 32–36)
MCV RBC AUTO: 90 FL (ref 80–100)
NON HDL CHOLESTEROL: 58 MG/DL (ref 0–149)
NRBC BLD-RTO: 0 /100 WBCS (ref 0–0)
PLATELET # BLD AUTO: 153 X10*3/UL (ref 150–450)
POTASSIUM SERPL-SCNC: 4.8 MMOL/L (ref 3.5–5.3)
PROT SERPL-MCNC: 7.1 G/DL (ref 6.4–8.2)
PSA SERPL-MCNC: 2.37 NG/ML
RBC # BLD AUTO: 4.76 X10*6/UL (ref 4.5–5.9)
SODIUM SERPL-SCNC: 140 MMOL/L (ref 136–145)
TRIGL SERPL-MCNC: 94 MG/DL (ref 0–149)
TSH SERPL-ACNC: 0.66 MIU/L (ref 0.44–3.98)
VLDL: 19 MG/DL (ref 0–40)
WBC # BLD AUTO: 8.2 X10*3/UL (ref 4.4–11.3)

## 2024-04-19 PROCEDURE — 84153 ASSAY OF PSA TOTAL: CPT

## 2024-04-19 PROCEDURE — 80061 LIPID PANEL: CPT

## 2024-04-19 PROCEDURE — 83036 HEMOGLOBIN GLYCOSYLATED A1C: CPT

## 2024-04-19 PROCEDURE — 82306 VITAMIN D 25 HYDROXY: CPT

## 2024-04-19 PROCEDURE — 80053 COMPREHEN METABOLIC PANEL: CPT

## 2024-04-19 PROCEDURE — 84443 ASSAY THYROID STIM HORMONE: CPT

## 2024-04-19 PROCEDURE — 85027 COMPLETE CBC AUTOMATED: CPT

## 2024-04-19 PROCEDURE — 36415 COLL VENOUS BLD VENIPUNCTURE: CPT

## 2024-04-21 DIAGNOSIS — E55.9 VITAMIN D DEFICIENCY: Primary | ICD-10-CM

## 2024-04-21 PROBLEM — R73.03 PREDIABETES: Status: ACTIVE | Noted: 2024-04-21

## 2024-04-21 RX ORDER — CHOLECALCIFEROL (VITAMIN D3) 50 MCG
50 TABLET ORAL DAILY
Qty: 30 TABLET | Refills: 11 | Status: SHIPPED | OUTPATIENT
Start: 2024-04-21 | End: 2025-04-21

## 2024-04-22 ENCOUNTER — HOSPITAL ENCOUNTER (OUTPATIENT)
Dept: CARDIOLOGY | Facility: CLINIC | Age: 54
Discharge: HOME | End: 2024-04-22
Payer: COMMERCIAL

## 2024-04-22 DIAGNOSIS — I51.9 CARDIOPATHY: ICD-10-CM

## 2024-04-22 DIAGNOSIS — I50.22 CHRONIC SYSTOLIC CONGESTIVE HEART FAILURE (MULTI): ICD-10-CM

## 2024-04-23 ENCOUNTER — TELEPHONE (OUTPATIENT)
Dept: PRIMARY CARE | Facility: CLINIC | Age: 54
End: 2024-04-23

## 2024-04-23 ENCOUNTER — HOSPITAL ENCOUNTER (OUTPATIENT)
Dept: RADIOLOGY | Facility: CLINIC | Age: 54
Discharge: HOME | End: 2024-04-23
Payer: COMMERCIAL

## 2024-04-23 DIAGNOSIS — Z87.891 FORMER SMOKER: ICD-10-CM

## 2024-04-23 LAB
ATRIAL RATE: 80 BPM
P AXIS: 57 DEGREES
P OFFSET: 185 MS
P ONSET: 132 MS
PR INTERVAL: 172 MS
Q ONSET: 218 MS
QRS COUNT: 13 BEATS
QRS DURATION: 98 MS
QT INTERVAL: 378 MS
QTC CALCULATION(BAZETT): 435 MS
QTC FREDERICIA: 416 MS
R AXIS: 21 DEGREES
T AXIS: 20 DEGREES
T OFFSET: 407 MS
VENTRICULAR RATE: 80 BPM

## 2024-04-23 PROCEDURE — 71271 CT THORAX LUNG CANCER SCR C-: CPT

## 2024-04-23 NOTE — TELEPHONE ENCOUNTER
----- Message from Kimberly Rodriguez MD sent at 4/21/2024  3:01 PM EDT -----  Please mail patient a diabetic diet

## 2024-05-01 ENCOUNTER — SPECIALTY PHARMACY (OUTPATIENT)
Dept: PHARMACY | Facility: CLINIC | Age: 54
End: 2024-05-01

## 2024-05-03 ENCOUNTER — HOSPITAL ENCOUNTER (OUTPATIENT)
Dept: CARDIOLOGY | Facility: CLINIC | Age: 54
Discharge: HOME | End: 2024-05-03
Payer: COMMERCIAL

## 2024-05-03 DIAGNOSIS — I48.92 UNSPECIFIED ATRIAL FLUTTER (MULTI): ICD-10-CM

## 2024-05-03 DIAGNOSIS — I48.91 UNSPECIFIED ATRIAL FIBRILLATION (MULTI): ICD-10-CM

## 2024-05-06 ENCOUNTER — HOSPITAL ENCOUNTER (OUTPATIENT)
Dept: CARDIOLOGY | Facility: CLINIC | Age: 54
Discharge: HOME | End: 2024-05-06
Payer: COMMERCIAL

## 2024-05-06 DIAGNOSIS — I48.92 UNSPECIFIED ATRIAL FLUTTER (MULTI): ICD-10-CM

## 2024-05-06 DIAGNOSIS — I48.91 UNSPECIFIED ATRIAL FIBRILLATION (MULTI): ICD-10-CM

## 2024-05-13 ENCOUNTER — HOSPITAL ENCOUNTER (OUTPATIENT)
Dept: CARDIOLOGY | Facility: CLINIC | Age: 54
Discharge: HOME | End: 2024-05-13
Payer: COMMERCIAL

## 2024-05-13 DIAGNOSIS — I50.22 CHRONIC SYSTOLIC CONGESTIVE HEART FAILURE (MULTI): ICD-10-CM

## 2024-05-13 DIAGNOSIS — I48.91 ATRIAL FIBRILLATION, UNSPECIFIED TYPE (MULTI): ICD-10-CM

## 2024-05-13 DIAGNOSIS — I51.9 CARDIOPATHY: ICD-10-CM

## 2024-05-13 RX ORDER — APIXABAN 5 MG/1
5 TABLET, FILM COATED ORAL 2 TIMES DAILY
Qty: 180 TABLET | Refills: 3 | Status: SHIPPED | OUTPATIENT
Start: 2024-05-13 | End: 2025-05-13

## 2024-05-17 ENCOUNTER — SPECIALTY PHARMACY (OUTPATIENT)
Dept: PHARMACY | Facility: CLINIC | Age: 54
End: 2024-05-17

## 2024-05-17 DIAGNOSIS — I48.91 ATRIAL FIBRILLATION, UNSPECIFIED TYPE (MULTI): ICD-10-CM

## 2024-05-20 ENCOUNTER — SPECIALTY PHARMACY (OUTPATIENT)
Dept: PHARMACY | Facility: CLINIC | Age: 54
End: 2024-05-20

## 2024-05-20 RX ORDER — APIXABAN 5 MG/1
5 TABLET, FILM COATED ORAL 2 TIMES DAILY
Qty: 180 TABLET | Refills: 3 | Status: SHIPPED | OUTPATIENT
Start: 2024-05-20 | End: 2025-05-20

## 2024-05-21 ENCOUNTER — HOSPITAL ENCOUNTER (OUTPATIENT)
Dept: CARDIOLOGY | Facility: CLINIC | Age: 54
Discharge: HOME | End: 2024-05-21
Payer: COMMERCIAL

## 2024-05-21 DIAGNOSIS — I51.9 CARDIOPATHY: ICD-10-CM

## 2024-05-21 DIAGNOSIS — I50.22 CHRONIC SYSTOLIC CONGESTIVE HEART FAILURE (MULTI): ICD-10-CM

## 2024-05-23 ENCOUNTER — HOSPITAL ENCOUNTER (OUTPATIENT)
Dept: CARDIOLOGY | Facility: CLINIC | Age: 54
Discharge: HOME | End: 2024-05-23
Payer: COMMERCIAL

## 2024-05-23 DIAGNOSIS — I50.22 CHRONIC SYSTOLIC CONGESTIVE HEART FAILURE (MULTI): ICD-10-CM

## 2024-05-23 DIAGNOSIS — I51.9 CARDIOPATHY: ICD-10-CM

## 2024-05-28 ENCOUNTER — HOSPITAL ENCOUNTER (OUTPATIENT)
Dept: CARDIOLOGY | Facility: CLINIC | Age: 54
Discharge: HOME | End: 2024-05-28
Payer: COMMERCIAL

## 2024-05-28 DIAGNOSIS — I50.22 CHRONIC SYSTOLIC CONGESTIVE HEART FAILURE (MULTI): ICD-10-CM

## 2024-05-28 DIAGNOSIS — I51.9 CARDIOPATHY: ICD-10-CM

## 2024-05-30 ENCOUNTER — HOSPITAL ENCOUNTER (OUTPATIENT)
Dept: CARDIOLOGY | Facility: CLINIC | Age: 54
Discharge: HOME | End: 2024-05-30
Payer: COMMERCIAL

## 2024-05-30 DIAGNOSIS — I51.9 CARDIOPATHY: ICD-10-CM

## 2024-05-30 DIAGNOSIS — I50.22 CHRONIC SYSTOLIC CONGESTIVE HEART FAILURE (MULTI): ICD-10-CM

## 2024-05-30 PROCEDURE — 93298 REM INTERROG DEV EVAL SCRMS: CPT

## 2024-05-30 PROCEDURE — 93298 REM INTERROG DEV EVAL SCRMS: CPT | Performed by: INTERNAL MEDICINE

## 2024-06-03 ENCOUNTER — HOSPITAL ENCOUNTER (OUTPATIENT)
Dept: CARDIOLOGY | Facility: CLINIC | Age: 54
Discharge: HOME | End: 2024-06-03
Payer: COMMERCIAL

## 2024-06-03 DIAGNOSIS — I50.22 CHRONIC SYSTOLIC CONGESTIVE HEART FAILURE (MULTI): ICD-10-CM

## 2024-06-03 DIAGNOSIS — I51.9 CARDIOPATHY: ICD-10-CM

## 2024-06-10 ENCOUNTER — HOSPITAL ENCOUNTER (OUTPATIENT)
Dept: CARDIOLOGY | Facility: CLINIC | Age: 54
Discharge: HOME | End: 2024-06-10
Payer: COMMERCIAL

## 2024-06-10 DIAGNOSIS — I50.22 CHRONIC SYSTOLIC CONGESTIVE HEART FAILURE (MULTI): ICD-10-CM

## 2024-06-10 DIAGNOSIS — I51.9 CARDIOPATHY: ICD-10-CM

## 2024-06-13 ENCOUNTER — SPECIALTY PHARMACY (OUTPATIENT)
Dept: PHARMACY | Facility: CLINIC | Age: 54
End: 2024-06-13

## 2024-06-19 DIAGNOSIS — I48.0 PAROXYSMAL ATRIAL FIBRILLATION (MULTI): ICD-10-CM

## 2024-06-19 DIAGNOSIS — Z01.818 PREOP TESTING: ICD-10-CM

## 2024-06-20 ENCOUNTER — PATIENT MESSAGE (OUTPATIENT)
Dept: CARDIOLOGY | Facility: CLINIC | Age: 54
End: 2024-06-20
Payer: COMMERCIAL

## 2024-06-20 ENCOUNTER — HOSPITAL ENCOUNTER (OUTPATIENT)
Dept: CARDIOLOGY | Facility: CLINIC | Age: 54
Discharge: HOME | End: 2024-06-20
Payer: COMMERCIAL

## 2024-06-20 DIAGNOSIS — I50.22 CHRONIC SYSTOLIC CONGESTIVE HEART FAILURE (MULTI): ICD-10-CM

## 2024-06-20 DIAGNOSIS — I51.9 CARDIOPATHY: ICD-10-CM

## 2024-06-21 ENCOUNTER — HOSPITAL ENCOUNTER (OUTPATIENT)
Dept: CARDIOLOGY | Facility: CLINIC | Age: 54
Discharge: HOME | End: 2024-06-21
Payer: COMMERCIAL

## 2024-06-21 DIAGNOSIS — I50.22 CHRONIC SYSTOLIC CONGESTIVE HEART FAILURE (MULTI): ICD-10-CM

## 2024-06-21 DIAGNOSIS — I51.9 CARDIOPATHY: ICD-10-CM

## 2024-06-24 ENCOUNTER — HOSPITAL ENCOUNTER (OUTPATIENT)
Dept: CARDIOLOGY | Facility: CLINIC | Age: 54
Discharge: HOME | End: 2024-06-24
Payer: COMMERCIAL

## 2024-06-24 DIAGNOSIS — I51.9 CARDIOPATHY: ICD-10-CM

## 2024-06-24 DIAGNOSIS — I50.22 CHRONIC SYSTOLIC CONGESTIVE HEART FAILURE (MULTI): ICD-10-CM

## 2024-08-12 DIAGNOSIS — E78.5 HYPERLIPIDEMIA, UNSPECIFIED HYPERLIPIDEMIA TYPE: ICD-10-CM

## 2024-08-12 DIAGNOSIS — Z00.00 HEALTH CARE MAINTENANCE: ICD-10-CM

## 2024-08-12 DIAGNOSIS — I48.0 PAROXYSMAL ATRIAL FIBRILLATION (MULTI): Primary | ICD-10-CM

## 2024-08-13 RX ORDER — METOPROLOL SUCCINATE 50 MG/1
50 TABLET, EXTENDED RELEASE ORAL DAILY
Qty: 90 TABLET | Refills: 1 | Status: SHIPPED | OUTPATIENT
Start: 2024-08-13

## 2024-08-13 RX ORDER — FOLIC ACID 1 MG/1
1 TABLET ORAL DAILY
Qty: 90 TABLET | Refills: 1 | Status: SHIPPED | OUTPATIENT
Start: 2024-08-13

## 2024-08-13 RX ORDER — ASPIRIN 81 MG/1
81 TABLET ORAL DAILY
Qty: 90 TABLET | Refills: 1 | Status: SHIPPED | OUTPATIENT
Start: 2024-08-13

## 2024-08-13 RX ORDER — ROSUVASTATIN CALCIUM 40 MG/1
40 TABLET, COATED ORAL DAILY
Qty: 90 TABLET | Refills: 1 | Status: SHIPPED | OUTPATIENT
Start: 2024-08-13

## 2024-09-10 ENCOUNTER — ANESTHESIA (OUTPATIENT)
Dept: CARDIOLOGY | Facility: HOSPITAL | Age: 54
End: 2024-09-10
Payer: COMMERCIAL

## 2024-09-10 ENCOUNTER — HOSPITAL ENCOUNTER (OUTPATIENT)
Facility: HOSPITAL | Age: 54
Setting detail: OUTPATIENT SURGERY
Discharge: HOME | End: 2024-09-10
Attending: INTERNAL MEDICINE | Admitting: INTERNAL MEDICINE
Payer: COMMERCIAL

## 2024-09-10 ENCOUNTER — ANESTHESIA EVENT (OUTPATIENT)
Dept: CARDIOLOGY | Facility: HOSPITAL | Age: 54
End: 2024-09-10
Payer: COMMERCIAL

## 2024-09-10 VITALS — HEIGHT: 76 IN | WEIGHT: 240 LBS | BODY MASS INDEX: 29.22 KG/M2 | OXYGEN SATURATION: 95 %

## 2024-09-10 DIAGNOSIS — I48.0 PAROXYSMAL A-FIB (MULTI): ICD-10-CM

## 2024-09-10 PROBLEM — M54.2 CHRONIC NECK PAIN: Status: ACTIVE | Noted: 2024-09-10

## 2024-09-10 PROBLEM — G89.29 CHRONIC NECK PAIN: Status: ACTIVE | Noted: 2024-09-10

## 2024-09-10 PROCEDURE — 2720000007 HC OR 272 NO HCPCS: Performed by: INTERNAL MEDICINE

## 2024-09-10 PROCEDURE — 93656 COMPRE EP EVAL ABLTJ ATR FIB: CPT | Performed by: INTERNAL MEDICINE

## 2024-09-10 PROCEDURE — 2500000005 HC RX 250 GENERAL PHARMACY W/O HCPCS

## 2024-09-10 PROCEDURE — 85347 COAGULATION TIME ACTIVATED: CPT | Performed by: INTERNAL MEDICINE

## 2024-09-10 PROCEDURE — 7100000010 HC PHASE TWO TIME - EACH INCREMENTAL 1 MINUTE: Performed by: INTERNAL MEDICINE

## 2024-09-10 PROCEDURE — 3700000002 HC GENERAL ANESTHESIA TIME - EACH INCREMENTAL 1 MINUTE: Performed by: INTERNAL MEDICINE

## 2024-09-10 PROCEDURE — 2500000004 HC RX 250 GENERAL PHARMACY W/ HCPCS (ALT 636 FOR OP/ED): Performed by: INTERNAL MEDICINE

## 2024-09-10 PROCEDURE — C1759 CATH, INTRA ECHOCARDIOGRAPHY: HCPCS | Performed by: INTERNAL MEDICINE

## 2024-09-10 PROCEDURE — 2780000003 HC OR 278 NO HCPCS: Performed by: INTERNAL MEDICINE

## 2024-09-10 PROCEDURE — 76937 US GUIDE VASCULAR ACCESS: CPT | Performed by: INTERNAL MEDICINE

## 2024-09-10 PROCEDURE — 7100000009 HC PHASE TWO TIME - INITIAL BASE CHARGE: Performed by: INTERNAL MEDICINE

## 2024-09-10 PROCEDURE — 2500000004 HC RX 250 GENERAL PHARMACY W/ HCPCS (ALT 636 FOR OP/ED)

## 2024-09-10 PROCEDURE — C1730 CATH, EP, 19 OR FEW ELECT: HCPCS | Performed by: INTERNAL MEDICINE

## 2024-09-10 PROCEDURE — C1732 CATH, EP, DIAG/ABL, 3D/VECT: HCPCS | Performed by: INTERNAL MEDICINE

## 2024-09-10 PROCEDURE — 3700000001 HC GENERAL ANESTHESIA TIME - INITIAL BASE CHARGE: Performed by: INTERNAL MEDICINE

## 2024-09-10 PROCEDURE — 2500000004 HC RX 250 GENERAL PHARMACY W/ HCPCS (ALT 636 FOR OP/ED): Performed by: STUDENT IN AN ORGANIZED HEALTH CARE EDUCATION/TRAINING PROGRAM

## 2024-09-10 PROCEDURE — G0269 OCCLUSIVE DEVICE IN VEIN ART: HCPCS | Performed by: INTERNAL MEDICINE

## 2024-09-10 PROCEDURE — C1766 INTRO/SHEATH,STRBLE,NON-PEEL: HCPCS | Performed by: INTERNAL MEDICINE

## 2024-09-10 PROCEDURE — C1760 CLOSURE DEV, VASC: HCPCS | Performed by: INTERNAL MEDICINE

## 2024-09-10 RX ORDER — ROCURONIUM BROMIDE 10 MG/ML
INJECTION, SOLUTION INTRAVENOUS AS NEEDED
Status: DISCONTINUED | OUTPATIENT
Start: 2024-09-10 | End: 2024-09-10

## 2024-09-10 RX ORDER — HEPARIN SODIUM 1000 [USP'U]/ML
INJECTION, SOLUTION INTRAVENOUS; SUBCUTANEOUS AS NEEDED
Status: DISCONTINUED | OUTPATIENT
Start: 2024-09-10 | End: 2024-09-10 | Stop reason: HOSPADM

## 2024-09-10 RX ORDER — PANTOPRAZOLE SODIUM 40 MG/1
40 TABLET, DELAYED RELEASE ORAL
Qty: 30 TABLET | Refills: 0 | Status: SHIPPED | OUTPATIENT
Start: 2024-09-10 | End: 2024-10-10

## 2024-09-10 RX ORDER — HEPARIN SODIUM 10000 [USP'U]/100ML
INJECTION, SOLUTION INTRAVENOUS CONTINUOUS PRN
Status: DISCONTINUED | OUTPATIENT
Start: 2024-09-10 | End: 2024-09-10 | Stop reason: HOSPADM

## 2024-09-10 RX ORDER — PROPOFOL 10 MG/ML
INJECTION, EMULSION INTRAVENOUS AS NEEDED
Status: DISCONTINUED | OUTPATIENT
Start: 2024-09-10 | End: 2024-09-10

## 2024-09-10 RX ORDER — PHENYLEPHRINE HCL IN 0.9% NACL 0.4MG/10ML
SYRINGE (ML) INTRAVENOUS AS NEEDED
Status: DISCONTINUED | OUTPATIENT
Start: 2024-09-10 | End: 2024-09-10

## 2024-09-10 RX ORDER — LIDOCAINE HYDROCHLORIDE 20 MG/ML
INJECTION, SOLUTION INFILTRATION; PERINEURAL AS NEEDED
Status: DISCONTINUED | OUTPATIENT
Start: 2024-09-10 | End: 2024-09-10

## 2024-09-10 RX ORDER — MIDAZOLAM HYDROCHLORIDE 1 MG/ML
INJECTION INTRAMUSCULAR; INTRAVENOUS AS NEEDED
Status: DISCONTINUED | OUTPATIENT
Start: 2024-09-10 | End: 2024-09-10

## 2024-09-10 RX ORDER — ONDANSETRON HYDROCHLORIDE 2 MG/ML
4 INJECTION, SOLUTION INTRAVENOUS ONCE
Status: COMPLETED | OUTPATIENT
Start: 2024-09-10 | End: 2024-09-10

## 2024-09-10 RX ORDER — PROTAMINE SULFATE 10 MG/ML
INJECTION, SOLUTION INTRAVENOUS AS NEEDED
Status: DISCONTINUED | OUTPATIENT
Start: 2024-09-10 | End: 2024-09-10

## 2024-09-10 RX ORDER — ONDANSETRON HYDROCHLORIDE 2 MG/ML
INJECTION, SOLUTION INTRAVENOUS AS NEEDED
Status: DISCONTINUED | OUTPATIENT
Start: 2024-09-10 | End: 2024-09-10

## 2024-09-10 RX ORDER — PHENYLEPHRINE 10 MG/250 ML(40 MCG/ML)IN 0.9 % SOD.CHLORIDE INTRAVENOUS
CONTINUOUS PRN
Status: DISCONTINUED | OUTPATIENT
Start: 2024-09-10 | End: 2024-09-10

## 2024-09-10 RX ORDER — FENTANYL CITRATE 50 UG/ML
INJECTION, SOLUTION INTRAMUSCULAR; INTRAVENOUS AS NEEDED
Status: DISCONTINUED | OUTPATIENT
Start: 2024-09-10 | End: 2024-09-10

## 2024-09-10 SDOH — HEALTH STABILITY: MENTAL HEALTH: CURRENT SMOKER: 0

## 2024-09-10 ASSESSMENT — PAIN SCALES - GENERAL: PAIN_LEVEL: 0

## 2024-09-10 NOTE — ANESTHESIA PREPROCEDURE EVALUATION
"Patient: Adi Tavera \"Roverto\"    Procedure Information       Date/Time: 09/10/24 1200    Procedure: Ablation A-Fib Paroxysmal - needs EPS, 3D with CARTO and RFA    Location: [facility] STEREO / Virtual [facility] MAT 3529 Cardiac Cath Lab    Providers: Praneeth Hernandez MD          There were no vitals filed for this visit.    Past Surgical History:   Procedure Laterality Date   • FOOT SURGERY  05/30/2013    Foot Surgery   • KIDNEY SURGERY  05/30/2013    Kidney Surgery   • NECK SURGERY  05/30/2013    Neck Surgery     Past Medical History:   Diagnosis Date   • Abnormal findings on diagnostic imaging of other specified body structures 05/22/2021    Abnormal chest xray   • Diverticulitis of intestine, part unspecified, without perforation or abscess without bleeding 03/23/2021    Acute diverticulitis   • Malignant neoplasm of unspecified kidney, except renal pelvis (Multi) 05/29/2013    Malignant neoplasm of kidney   • Personal history of other diseases of the respiratory system 05/22/2021    History of interstitial lung disease   • Personal history of other malignant neoplasm of kidney 08/18/2017    History of renal cell carcinoma   • Personal history of other mental and behavioral disorders 07/12/2021    History of anxiety     No current facility-administered medications for this encounter.  Prior to Admission medications    Medication Sig Start Date End Date Taking? Authorizing Provider   aspirin 81 mg EC tablet Take 1 tablet (81 mg) by mouth once daily. 8/13/24  Yes Kimberly Rodriguez MD   cholecalciferol (Vitamin D-3) 50 MCG (2000 UT) tablet Take 1 tablet (50 mcg) by mouth once daily. 4/21/24 4/21/25 Yes Kimberly Rodriguez MD   Eliquis 5 mg tablet Take 1 tablet (5 mg) by mouth 2 times a day. 5/13/24 5/13/25 Yes Callie Ortega APRN-CNP   folic acid (Folvite) 1 mg tablet Take 1 tablet (1 mg) by mouth once daily. 8/13/24  Yes Kimberly Rodriguez MD   lisinopril 20 mg tablet Take 1 tablet (20 mg) by mouth once daily. 4/18/24 10/15/24 Yes Kimberly" LARA Rodriguez MD   metoprolol succinate XL (Toprol-XL) 50 mg 24 hr tablet Take 1 tablet (50 mg) by mouth once daily. 8/13/24  Yes Kimberly Rodriguez MD   rosuvastatin (Crestor) 40 mg tablet Take 1 tablet (40 mg) by mouth once daily. 8/13/24  Yes Kimberly Rodriguez MD   Eliquis 5 mg tablet Take 1 tablet (5 mg) by mouth 2 times a day. 5/20/24 5/20/25  Callie Ortega, APRN-CNP     No Known Allergies  Social History     Tobacco Use   • Smoking status: Former     Types: Cigarettes   • Smokeless tobacco: Never   Substance Use Topics   • Alcohol use: Not Currently         Chemistry    Lab Results   Component Value Date/Time     04/19/2024 0954    K 4.8 04/19/2024 0954     04/19/2024 0954    CO2 25 04/19/2024 0954    BUN 17 04/19/2024 0954    CREATININE 0.87 04/19/2024 0954    Lab Results   Component Value Date/Time    CALCIUM 9.3 04/19/2024 0954    ALKPHOS 87 04/19/2024 0954    AST 20 04/19/2024 0954    ALT 31 04/19/2024 0954    BILITOT 0.6 04/19/2024 0954          Lab Results   Component Value Date/Time    WBC 8.2 04/19/2024 0954    HGB 14.4 04/19/2024 0954    HCT 43.0 04/19/2024 0954     04/19/2024 0954     Lab Results   Component Value Date/Time    PROTIME 11.1 05/21/2021 1016    INR 1.0 05/21/2021 1016     Encounter Date: 04/18/24   ECG 12 lead (Clinic Performed)   Result Value    Ventricular Rate 80    Atrial Rate 80    IN Interval 172    QRS Duration 98    QT Interval 378    QTC Calculation(Bazett) 435    P Axis 57    R Axis 21    T Axis 20    QRS Count 13    Q Onset 218    P Onset 132    P Offset 185    T Offset 407    QTC Fredericia 416    Narrative    Normal sinus rhythm  Normal ECG  When compared with ECG of 12-JUL-2021 16:01,  T wave inversion less evident in Inferior leads  Nonspecific T wave abnormality no longer evident in Lateral leads  Confirmed by Praneeth Hernandez ([id number]) on 4/23/2024 2:32:42 PM     No results found for this or any previous visit from the past 1095 days.      Relevant Problems    Anesthesia (within normal limits)      Cardiac   (+) Hyperlipidemia   (+) Hypertension   (+) Paroxysmal atrial fibrillation (Multi)      Pulmonary   (+) Pulmonary nodules      Neuro   (+) CVA (cerebral vascular accident) (Multi)   (+) Depression      GI (within normal limits)      Liver (within normal limits)      Endocrine   (+) Class 1 obesity due to excess calories with serious comorbidity and body mass index (BMI) of 30.0 to 30.9 in adult      Hematology (within normal limits)      Musculoskeletal   (+) Chronic neck pain      Skin (within normal limits)       Clinical information reviewed:   Tobacco  Allergies  Meds   Med Hx  Surg Hx   Fam Hx          NPO Detail:  No data recorded     Physical Exam    Airway  Mallampati: II  TM distance: >3 FB  Neck ROM: full     Cardiovascular - normal exam  Rhythm: irregular  Rate: abnormal     Dental        Pulmonary - normal exam     Abdominal   (+) obese           Anesthesia Plan    History of general anesthesia?: yes  History of complications of general anesthesia?: no    ASA 3     general   (Plan GA by JOSE, art line)  The patient is not a current smoker.  Education provided regarding risk of obstructive sleep apnea.  intravenous induction   Anesthetic plan and risks discussed with patient and spouse.  Use of blood products discussed with patient and spouse who consented to blood products.    Plan discussed with CRNA and attending.

## 2024-09-10 NOTE — ANESTHESIA PROCEDURE NOTES
Peripheral IV  Date/Time: 9/10/2024 11:52 AM  Inserted by: BRITTANY An-CRNA    Placement  Needle size: 16 G  Laterality: right  Location: hand  Local anesthetic: none  Site prep: alcohol  Technique: anatomical landmarks  Attempts: 1

## 2024-09-10 NOTE — Clinical Note
Hemodynamic monitors, defibrillation patches, grounding pads and carto mapping patches applied to patient. Patient positioned supine.

## 2024-09-10 NOTE — ANESTHESIA POSTPROCEDURE EVALUATION
"Patient: Adi Tavera \"Roverto\"    Procedure Summary       Date: 09/10/24 Room / Location: [facility] [place] STEREO / Virtual [facility] [place] MAT 3529 Cardiac Cath Lab    Anesthesia Start: 1132 Anesthesia Stop: 1459    Procedure: Ablation A-Fib Paroxysmal Diagnosis:       Paroxysmal A-fib (Multi)      (Paroxysmal A-fib (Multi) [I48.0])    Providers: Praneeth Hernandez MD Responsible Provider: Jermaine Naik MD    Anesthesia Type: general ASA Status: 3            Anesthesia Type: general    Vitals Value Taken Time   /85 09/10/24 1459   Temp  09/10/24 1459   Pulse 80 09/10/24 1459   Resp 12 09/10/24 1459   SpO2 97 09/10/24 1459       Anesthesia Post Evaluation    Patient location during evaluation: bedside  Patient participation: complete - patient participated  Level of consciousness: awake and awake and alert  Pain score: 0  Pain management: adequate  Airway patency: patent  Cardiovascular status: acceptable  Respiratory status: acceptable  Hydration status: acceptable  Postoperative Nausea and Vomiting: none        There were no known notable events for this encounter.    "

## 2024-09-10 NOTE — ANESTHESIA PROCEDURE NOTES
Arterial Line:    Date/Time: 9/10/2024 11:56 AM    Staffing  Performed: CRNA   Authorized by: Davonte Gutiérrez MD    Performed by: BIANCA An    An arterial line was placed. in the OR for the following indication(s): continuous blood pressure monitoring and blood sampling needed.    A 20 gauge (size), 1 and 3/4 inch (length), Arrow (type) catheter was placed into the Left radial artery, secured by Tegaderm,   Seldinger technique used.  Events:  patient tolerated procedure well with no complications.

## 2024-09-10 NOTE — H&P
"History Of Present Illness  Adi Tavera \"Roverto\" is a 54 y.o. male presenting with atrial fibrillation.    Patient is a 54-year-old gentleman with a history of CVA, atrial fibrillation.  He is on aspirin 81, Eliquis 5 twice daily, metoprolol succinate 50.  He does have a history of ongoing alcohol dependence and remote cocaine abuse history. He has a loop recorder showing 8% burden on AF    Presents for PVI with Dr. Hernandez    Accompanied by wife  No other complaints today.     Past Medical History  Past Medical History:   Diagnosis Date    Abnormal findings on diagnostic imaging of other specified body structures 05/22/2021    Abnormal chest xray    Diverticulitis of intestine, part unspecified, without perforation or abscess without bleeding 03/23/2021    Acute diverticulitis    Malignant neoplasm of unspecified kidney, except renal pelvis (Multi) 05/29/2013    Malignant neoplasm of kidney    Personal history of other diseases of the respiratory system 05/22/2021    History of interstitial lung disease    Personal history of other malignant neoplasm of kidney 08/18/2017    History of renal cell carcinoma    Personal history of other mental and behavioral disorders 07/12/2021    History of anxiety       Surgical History  Past Surgical History:   Procedure Laterality Date    FOOT SURGERY  05/30/2013    Foot Surgery    KIDNEY SURGERY  05/30/2013    Kidney Surgery    NECK SURGERY  05/30/2013    Neck Surgery        Social History  He reports that he has quit smoking. His smoking use included cigarettes. He has never used smokeless tobacco. He reports that he does not currently use alcohol. He reports that he does not use drugs.    Family History  No family history on file.     Allergies  Patient has no known allergies.    Review of Systems     Physical Exam     Last Recorded Vitals  Height 1.93 m (6' 4\"), weight 109 kg (240 lb).    Relevant Results        GEN: NAD  HEENT: ATNC,  anicteric, no JVD  CV: RRR, no " r/g/m  Pulm: CTAB  Abdomen: NTND  Ext: warm, no LE edema noted  Neuro: A+Ox3  Psych: appropriate       Assessment/Plan   Assessment & Plan    Plan for PVI today with GA      José Manuel Saxena MD

## 2024-09-10 NOTE — DISCHARGE INSTRUCTIONS
INSTRUCTIONS AFTER ABLATION PROCEDURE:    * You will need to continue blood thinner (Eliquis every 12 hours) until instructed otherwise. It is important not to interrupt blood thinner for any reason (other than an emergency) during the first 30 days after ablation.    * You will be on Pantoprazole (a heartburn medicine) for 4 weeks to protect the esophagus as it can become irritated with ablation. It is very important that you take this medication. I have sent a prescription to [facility] [place].    * All other medications will generally remain the same unless you are told otherwise.  Resume taking your home medications today (including blood thinner) as listed on the discharge instructions.    * In the first week post-ablation you should take it easy. No heavy lifting or heavy exercise, no treadmill. You can use the stairs if needed but go slowly and minimize the number of times up and down.    * Some minor bruising is common at each groin access site with minor soreness as if you had banged the area. Bruising may occasionally be seen to extend down the leg. This is normal as is an occasional small quarter sized bump in the area. If larger swelling or more significant pain occurs at the area, please contact the office or go the nearest Emergency Room.    * You may have some minor chest pain for the next week or so. The pain will often worsen with a deep breath and be better when leaning forward. This is pericardial chest pain from the ablation and is generally not of concern. It should resolve within a week although it might increase for a day or so after the ablation.    * If you develop unexplained fevers exceeding 100 degrees anytime within the first 3 weeks post-ablation, you need to contact the office. Low grade fevers of around 99 degrees are common in the first day or so post-ablation.    * Atrial fibrillation (AFib) can recur in all patients who undergo this ablation for up to 4-8 weeks  post-ablation. The ablation itself can cause inflammation (pericarditis) in the atria and this can cause AFib. Some patients will actually experience an increased amount of atrial arrhythmia early after ablation. Approximately 1/3 of patients will have this early recurrence of AFib. Medications should be continued and your heart rate controlled. Nothing else needs be done initially except waiting as in many cases these episodes of AFib will prove self limited.    * Continue to follow up with your primary care physician, primary cardiologist, and any other specialists you normally see.    * No driving for 2 days post procedure (IF you were driving prior to procedure)    *Diet: Heart healthy    Call Provider If:  Breathing faster than normal.     Fever of 100.4 F (38 C) or higher.     Chills.     Any new concerning symptoms.     Passing out.     Patient Instructions, Next 24 hours:  DO NOT drive a car, operate machinery or power tools.  It is recommended that a responsible adult be with you for the first 24 hours.     DO NOT drink any alcoholic drinks or take any non-prescriptive medications that contain alcohol for the first 24 hours.     DO NOT make any important decisions for the first 24 hours.    Activity:  You are advised to go directly home from the hospital.     DO NOT lift anything heavier than 10 pounds for one week, this allows for proper healing of the groin.     No excessive exercise or treadmill use for one week. You may walk and do stairs, slowly.     No sexual activities for 24 hours after you arrive home.    Wound Care:  If slight bleeding should occur at groin site, lie down and have someone apply firm pressure just above the puncture site for 5 minutes.  If it continues or is profuse, call 911. Always notify your doctor if bleeding occurs.     Keep site clean and dry. Let air dry or you may use a simple bandaid.     Gently cleanse the puncture site in your groin with soap and water only.     You may  experience some tenderness, bruising or minimal inflammation.  If you have any concerns, you may contact the EP Lab or if any of these symptoms become excessive, contact your electrophysiologist or go to the emergency room.     No tub baths, soaking, hot tubs, or swimming for one week.     May shower the next day after your procedure.    Other Instructions:  If you have any questions about the effects of the sedative drugs or groin care, call the physician who performed your procedure.    FOLLOW UP:  1) Primary care physician 2 weeks--call to schedule    2) Brandy Matias CNP ( Electrophysiology) 1 month after ablation as scheduled

## 2024-09-10 NOTE — ANESTHESIA PROCEDURE NOTES
Airway  Date/Time: 9/10/2024 11:56 AM  Urgency: elective    Airway not difficult    Staffing  Performed: CRNA   Authorized by: Davonte Gutiérrez MD    Performed by: BRITTANY An-MARLIN  Patient location during procedure: OR    Indications and Patient Condition  Indications for airway management: anesthesia and airway protection  Spontaneous ventilation: present  Sedation level: no sedation  Preoxygenated: yes  Patient position: sniffing  MILS maintained throughout  Mask difficulty assessment: 1 - vent by mask    Final Airway Details  Final airway type: endotracheal airway      Successful airway: ETT  Cuffed: yes   Successful intubation technique: video laryngoscopy  Facilitating devices/methods: intubating stylet  Endotracheal tube insertion site: oral  Blade: Harley  Blade size: #4  ETT size (mm): 7.5  Cormack-Lehane Classification: grade I - full view of glottis  Placement verified by: chest auscultation and capnometry   Measured from: lips  ETT to lips (cm): 23  Number of attempts at approach: 1  Ventilation between attempts: supraglottic airway    Additional Comments  Oral airway, elective [facility]

## 2024-10-07 PROBLEM — F41.9 ANXIETY: Status: ACTIVE | Noted: 2024-10-07

## 2024-10-07 PROBLEM — C64.9 MALIGNANT NEOPLASM OF KIDNEY (MULTI): Status: ACTIVE | Noted: 2024-10-07

## 2024-10-07 PROBLEM — I25.10 ARTERIOSCLEROSIS OF CORONARY ARTERY: Status: ACTIVE | Noted: 2024-10-07

## 2024-10-07 PROBLEM — D75.89 MACROCYTOSIS: Status: ACTIVE | Noted: 2024-10-07

## 2024-10-07 PROBLEM — R06.2 WHEEZING: Status: ACTIVE | Noted: 2024-10-07

## 2024-10-07 PROBLEM — J84.9 INTERSTITIAL LUNG DISEASE (MULTI): Status: ACTIVE | Noted: 2024-10-07

## 2024-10-08 ENCOUNTER — OFFICE VISIT (OUTPATIENT)
Dept: CARDIOLOGY | Facility: CLINIC | Age: 54
End: 2024-10-08
Payer: COMMERCIAL

## 2024-10-08 VITALS
HEIGHT: 76 IN | SYSTOLIC BLOOD PRESSURE: 135 MMHG | WEIGHT: 248.4 LBS | OXYGEN SATURATION: 95 % | DIASTOLIC BLOOD PRESSURE: 81 MMHG | HEART RATE: 87 BPM | BODY MASS INDEX: 30.25 KG/M2

## 2024-10-08 DIAGNOSIS — R94.31 ABNORMAL EKG: Primary | ICD-10-CM

## 2024-10-08 LAB
ATRIAL RATE: 85 BPM
P AXIS: 40 DEGREES
P OFFSET: 190 MS
P ONSET: 134 MS
PR INTERVAL: 164 MS
Q ONSET: 216 MS
QRS COUNT: 14 BEATS
QRS DURATION: 96 MS
QT INTERVAL: 366 MS
QTC CALCULATION(BAZETT): 435 MS
QTC FREDERICIA: 411 MS
R AXIS: 26 DEGREES
T AXIS: 27 DEGREES
T OFFSET: 399 MS
VENTRICULAR RATE: 85 BPM

## 2024-10-08 PROCEDURE — 99214 OFFICE O/P EST MOD 30 MIN: CPT | Performed by: NURSE PRACTITIONER

## 2024-10-08 PROCEDURE — 3079F DIAST BP 80-89 MM HG: CPT | Performed by: NURSE PRACTITIONER

## 2024-10-08 PROCEDURE — 3075F SYST BP GE 130 - 139MM HG: CPT | Performed by: NURSE PRACTITIONER

## 2024-10-08 PROCEDURE — 93005 ELECTROCARDIOGRAM TRACING: CPT | Performed by: NURSE PRACTITIONER

## 2024-10-08 PROCEDURE — 1036F TOBACCO NON-USER: CPT | Performed by: NURSE PRACTITIONER

## 2024-10-08 PROCEDURE — 3008F BODY MASS INDEX DOCD: CPT | Performed by: NURSE PRACTITIONER

## 2024-10-08 ASSESSMENT — ENCOUNTER SYMPTOMS
OCCASIONAL FEELINGS OF UNSTEADINESS: 0
DEPRESSION: 0
RESPIRATORY NEGATIVE: 1
NEUROLOGICAL NEGATIVE: 1
GASTROINTESTINAL NEGATIVE: 1
MUSCULOSKELETAL NEGATIVE: 1
LOSS OF SENSATION IN FEET: 0
CARDIOVASCULAR NEGATIVE: 1
CONSTITUTIONAL NEGATIVE: 1

## 2024-10-08 ASSESSMENT — PAIN SCALES - GENERAL: PAINLEVEL: 0-NO PAIN

## 2024-10-08 NOTE — PROGRESS NOTES
"Chief Complaint:   Follow-up    History Of Present Illness:    .Mr Tavera returns in follow up.  Denies chest pain, sob, palpitations or pedal edema.  He had a RFA done 09/10/2024 with Dr Hernandez.  Will see EP next week.          Last Recorded Vitals:  Blood pressure 135/81, pulse 87, height 1.93 m (6' 4\"), weight 113 kg (248 lb 6.4 oz), SpO2 95%.     Past Medical History:  Past Medical History:   Diagnosis Date    Abnormal findings on diagnostic imaging of other specified body structures 05/22/2021    Abnormal chest xray    Diverticulitis of intestine, part unspecified, without perforation or abscess without bleeding 03/23/2021    Acute diverticulitis    Malignant neoplasm of unspecified kidney, except renal pelvis (Multi) 05/29/2013    Malignant neoplasm of kidney    Personal history of other diseases of the respiratory system 05/22/2021    History of interstitial lung disease    Personal history of other malignant neoplasm of kidney 08/18/2017    History of renal cell carcinoma    Personal history of other mental and behavioral disorders 07/12/2021    History of anxiety        Past Surgical History:  Past Surgical History:   Procedure Laterality Date    CARDIAC ELECTROPHYSIOLOGY PROCEDURE N/A 9/10/2024    Procedure: Ablation A-Fib Paroxysmal;  Surgeon: Praneeth Hernandez MD;  Location: [place] [address] Cardiac Cath Lab;  Service: Electrophysiology;  Laterality: N/A;  needs EPS, 3D with CARTO and RFA    FOOT SURGERY  05/30/2013    Foot Surgery    KIDNEY SURGERY  05/30/2013    Kidney Surgery    NECK SURGERY  05/30/2013    Neck Surgery       Social History:  Social History     Socioeconomic History    Marital status: Single   Tobacco Use    Smoking status: Former     Types: Cigarettes    Smokeless tobacco: Never   Substance and Sexual Activity    Alcohol use: Not Currently    Drug use: Never     Social Determinants of Health     Financial Resource Strain: Not at Risk (11/17/2023)    [facility] from [facility]    Financial " Resource Strain     Financial Resource Strain: 1   Food Insecurity: Not on File (2024)    Received from [facility]    Food Insecurity     Food: 0   Transportation Needs: Not at Risk (2023)    Received from [facility]    Transportation Needs     Transportation: 1   Physical Activity: Not on File (2023)    Received from [facility]    Physical Activity     Physical Activity: 0   Stress: Not at Risk (2023)    Received from [facility]    Stress     Stress: 1   Social Connections: Not at Risk (2023)    Received from [facility]    Social Connections     Connectedness: 1   Housing Stability: Not at Risk (2023)    Received from [facility]    Housing Stability     Housin       Family History:  No family history on file.      Allergies:  Patient has no known allergies.    Outpatient Medications:  Current Outpatient Medications   Medication Sig Dispense Refill    aspirin 81 mg EC tablet Take 1 tablet (81 mg) by mouth once daily. 90 tablet 1    cholecalciferol (Vitamin D-3) 50 MCG (2000 UT) tablet Take 1 tablet (50 mcg) by mouth once daily. 30 tablet 11    Eliquis 5 mg tablet Take 1 tablet (5 mg) by mouth 2 times a day. 180 tablet 3    Eliquis 5 mg tablet Take 1 tablet (5 mg) by mouth 2 times a day. 180 tablet 3    folic acid (Folvite) 1 mg tablet Take 1 tablet (1 mg) by mouth once daily. 90 tablet 1    lisinopril 20 mg tablet Take 1 tablet (20 mg) by mouth once daily. 90 tablet 1    metoprolol succinate XL (Toprol-XL) 50 mg 24 hr tablet Take 1 tablet (50 mg) by mouth once daily. 90 tablet 1    pantoprazole (ProtoNix) 40 mg EC tablet Take 1 tablet (40 mg) by mouth once daily in the morning. Take before meals. Do not crush, chew, or split. 30 tablet 0    rosuvastatin (Crestor) 40 mg tablet Take 1 tablet (40 mg) by mouth once daily. 90 tablet 1     No current facility-administered medications for this visit.        Physical Exam:  Cardiovascular:      PMI at left midclavicular line. Normal rate.  Regular rhythm. Normal S1. Normal S2.       Murmurs: There is no murmur.      No gallop.  No click. No rub.   Pulses:     Intact distal pulses.   Edema:     Peripheral edema absent.         ROS:  Review of Systems   Constitutional: Negative.   Cardiovascular: Negative.    Respiratory: Negative.     Skin: Negative.    Musculoskeletal: Negative.    Gastrointestinal: Negative.    Genitourinary: Negative.    Neurological: Negative.           Last Labs:  CBC -  Lab Results   Component Value Date    WBC 8.2 04/19/2024    HGB 14.4 04/19/2024    HCT 43.0 04/19/2024    MCV 90 04/19/2024     04/19/2024       CMP -  Lab Results   Component Value Date    CALCIUM 9.3 04/19/2024    PROT 7.1 04/19/2024    ALBUMIN 4.1 04/19/2024    AST 20 04/19/2024    ALT 31 04/19/2024    ALKPHOS 87 04/19/2024    BILITOT 0.6 04/19/2024       LIPID PANEL -   Lab Results   Component Value Date    CHOL 105 04/19/2024    TRIG 94 04/19/2024    HDL 46.7 04/19/2024    CHHDL 2.2 04/19/2024    LDLF 86 01/30/2023    VLDL 19 04/19/2024    NHDL 58 04/19/2024       RENAL FUNCTION PANEL -   Lab Results   Component Value Date    GLUCOSE 96 04/19/2024     04/19/2024    K 4.8 04/19/2024     04/19/2024    CO2 25 04/19/2024    ANIONGAP 13 04/19/2024    BUN 17 04/19/2024    CREATININE 0.87 04/19/2024    GFRMALE 89 01/30/2023    CALCIUM 9.3 04/19/2024    ALBUMIN 4.1 04/19/2024        Lab Results   Component Value Date    HGBA1C 6.0 (H) 04/19/2024         Assessment/Plan   Problem List Items Addressed This Visit    None    Assessment:     1. Status post right hemispheric CVA. The patient is a 51-year-old white male with a history of longstanding hypertension hyperlipidemia chronic smoking of 1-1-1/2 packs/day with remote substance abuse including marijuana and cocaine. He also has a history of ongoing alcohol abuse. The patient was actually [facility] and 5/2021 for detoxification. The patient subsequently was referred to a  detoxification facility at [facility]. The patient was in the midst of the program when he developed a right hemispheric CVA manifested by a left facial droop slurred speech and the paresthesias of the left-sided extremities that occurred on 6/6/2021. The patient was admitted for a period of 1 week to the [facility]. Reportedly he was identified as having paroxysmal atrial fibrillation. Loop recorder was implanted and he was started on Eliquis 5 mg twice daily. After his 1 week hospitalization he was sent to the [facility] for rehabilitation and ultimately was released on 6/21/2021. The patient presents for initial neurologic cardiology follow-up. His EKG today demonstrates sinus rhythm with moderate voltage criteria for left we will attempt to obtain the records from the [facility] the patient is presently taking clonidine only 0.1 mg daily. And will be discontinued in favor of Toprol-XL 50 mg daily. He will also be advised to restart lisinopril 10 mg daily for hypertension and will be aware of the fact that the patient solitary kidney. The patient will be reminded to restart previously prescribed atorvastatin 40 mg daily. Apparently patient has a loop recorder without current monitoring.  Will refer to EP to re institute monitoring.  Patient was found to have 8% afib burden on the monitor and has had a RFA done 09/10/2024.     2. Paroxysmal atrial fibrillation. See discussion above. Obtain records from [facility].     3. Eliquis anticoagulation.     4. Hypertension. Blood pressure is acceptable today. We will continue observation now on lisinopril 10 mg daily Toprol-XL 50 mg daily     5. Hyperlipidemia. Was on atorvastatin 40 mg daily. Now on rosuvastatin 40 mg daily and pcp will draw fasting labs.     6. History of chronic smoking. Quit 3 months ago.      7 history of alcohol abuse.     8. Status post left-sided nephrectomy for renal cell carcinoma 2004.     9.  CAD. Coronary artery calcium score done 06/2023 was 162.08.  Stress echo done 07/2023  Summary:  1. The resting ejection fraction was estimated at 55 to 60% with a peak exercise ejection fraction estimated at 60 to 65%.  2. No clinical, electrocardiographic or echocardiographic evidence for ischemia at a maximal workload.  3. Submaximal level of stress achieved.            Callie Ortega, APRN-CNP

## 2024-10-09 ENCOUNTER — OFFICE VISIT (OUTPATIENT)
Dept: CARDIOLOGY | Facility: CLINIC | Age: 54
End: 2024-10-09
Payer: COMMERCIAL

## 2024-10-09 VITALS
BODY MASS INDEX: 30.08 KG/M2 | OXYGEN SATURATION: 96 % | WEIGHT: 247 LBS | SYSTOLIC BLOOD PRESSURE: 137 MMHG | RESPIRATION RATE: 16 BRPM | HEART RATE: 85 BPM | HEIGHT: 76 IN | DIASTOLIC BLOOD PRESSURE: 88 MMHG

## 2024-10-09 DIAGNOSIS — Z86.73 HISTORY OF CVA (CEREBROVASCULAR ACCIDENT): ICD-10-CM

## 2024-10-09 DIAGNOSIS — I48.0 PAF (PAROXYSMAL ATRIAL FIBRILLATION) (MULTI): Primary | ICD-10-CM

## 2024-10-09 PROCEDURE — 3008F BODY MASS INDEX DOCD: CPT | Performed by: NURSE PRACTITIONER

## 2024-10-09 PROCEDURE — 1036F TOBACCO NON-USER: CPT | Performed by: NURSE PRACTITIONER

## 2024-10-09 PROCEDURE — 3075F SYST BP GE 130 - 139MM HG: CPT | Performed by: NURSE PRACTITIONER

## 2024-10-09 PROCEDURE — 99214 OFFICE O/P EST MOD 30 MIN: CPT | Performed by: NURSE PRACTITIONER

## 2024-10-09 PROCEDURE — 3079F DIAST BP 80-89 MM HG: CPT | Performed by: NURSE PRACTITIONER

## 2024-10-09 ASSESSMENT — ENCOUNTER SYMPTOMS
HEMOPTYSIS: 0
MYALGIAS: 0
NAUSEA: 0
DIAPHORESIS: 0
SPUTUM PRODUCTION: 0
ORTHOPNEA: 0
DIARRHEA: 0
BLURRED VISION: 0
IRREGULAR HEARTBEAT: 0
HEADACHES: 0
DYSPNEA ON EXERTION: 0
FALLS: 0
SNORING: 0
SHORTNESS OF BREATH: 0
SORE THROAT: 0
ABDOMINAL PAIN: 0
LIGHT-HEADEDNESS: 0
COUGH: 0
SYNCOPE: 0
NEAR-SYNCOPE: 0
PALPITATIONS: 0
DOUBLE VISION: 0
DIZZINESS: 0
VOMITING: 0
PND: 0
FEVER: 0
WEAKNESS: 0

## 2024-10-09 ASSESSMENT — PATIENT HEALTH QUESTIONNAIRE - PHQ9
SUM OF ALL RESPONSES TO PHQ9 QUESTIONS 1 AND 2: 0
1. LITTLE INTEREST OR PLEASURE IN DOING THINGS: NOT AT ALL
2. FEELING DOWN, DEPRESSED OR HOPELESS: NOT AT ALL

## 2024-10-09 NOTE — PROGRESS NOTES
"Subjective   Roverto Tavera is a 54 y.o. male.    Chief Complaint:  Follow-up (From ablation)    Adi Tavera is a 54 y.o. with:      1. HTN, HLD  2. Hx of ongoing ETOH dependence and remote Cocaine abuse. 05/2021 admitted at [facility] for detoxification.   3. Renal Cell Carcinoma - s/p Lt nephrectomy (2004)   4. CAD - elevated CACS (6/2023) Total 162.08   5. CVA - (6/6/21) Rt hemisphere . Subsequently had a loop recorder that showed AF and was started on Eliquis.  ([facility]).   6. Palpitations / Paroxysmal AF - (index dx ? June 2021) s/p loop recorder implant.       TESTING:    -Stress Echo (7/3/23) Resting LVEF 55-60% and 60-65% at peak.  No evidence of ischemia on ECG or imaging.      Now s/p Afib RFA with Dr. Hernandez 9/10/2024  ECG 10/8/2024 NSR HR 85 bpm    TODAY patient presents today for 1 month follow-up post A-fib ablation.  He denies any further episodes of atrial fibrillation.  He does have a loop recorder implanted.  His only complaint is he had a bruise at the right groin puncture site for the first week, but that is now resolved.    /88 (BP Location: Right arm)   Pulse 85   Resp 16   Ht 1.93 m (6' 4\")   Wt 112 kg (247 lb)   SpO2 96%   BMI 30.07 kg/m²   Current Outpatient Medications on File Prior to Visit   Medication Sig Dispense Refill    aspirin 81 mg EC tablet Take 1 tablet (81 mg) by mouth once daily. 90 tablet 1    cholecalciferol (Vitamin D-3) 50 MCG (2000 UT) tablet Take 1 tablet (50 mcg) by mouth once daily. 30 tablet 11    Eliquis 5 mg tablet Take 1 tablet (5 mg) by mouth 2 times a day. 180 tablet 3    folic acid (Folvite) 1 mg tablet Take 1 tablet (1 mg) by mouth once daily. 90 tablet 1    lisinopril 20 mg tablet Take 1 tablet (20 mg) by mouth once daily. 90 tablet 1    metoprolol succinate XL (Toprol-XL) 50 mg 24 hr tablet Take 1 tablet (50 mg) by mouth once daily. 90 tablet 1    rosuvastatin (Crestor) 40 mg tablet Take 1 tablet (40 mg) by mouth once daily. 90 " tablet 1    [DISCONTINUED] pantoprazole (ProtoNix) 40 mg EC tablet Take 1 tablet (40 mg) by mouth once daily in the morning. Take before meals. Do not crush, chew, or split. 30 tablet 0    [DISCONTINUED] Eliquis 5 mg tablet Take 1 tablet (5 mg) by mouth 2 times a day. 180 tablet 3     No current facility-administered medications on file prior to visit.         Review of Systems   Constitutional: Negative for diaphoresis, fever and malaise/fatigue.   HENT:  Negative for congestion and sore throat.    Eyes:  Negative for blurred vision and double vision.   Cardiovascular:  Negative for chest pain, dyspnea on exertion, irregular heartbeat, leg swelling, near-syncope, orthopnea, palpitations, paroxysmal nocturnal dyspnea and syncope.   Respiratory:  Negative for cough, hemoptysis, shortness of breath, snoring and sputum production.    Hematologic/Lymphatic: Negative for bleeding problem.   Skin:  Negative for rash.   Musculoskeletal:  Negative for falls, joint pain and myalgias.   Gastrointestinal:  Negative for abdominal pain, diarrhea, nausea and vomiting.   Neurological:  Negative for dizziness, headaches, light-headedness and weakness.   All other systems reviewed and are negative.      Objective   Constitutional:       Appearance: Healthy appearance. Not in distress.   Eyes:      Conjunctiva/sclera: Conjunctivae normal.   HENT:      Nose: Nose normal.    Mouth/Throat:      Pharynx: Oropharynx is clear.   Neck:      Vascular: No JVR. JVD normal.   Pulmonary:      Effort: Pulmonary effort is normal.      Breath sounds: Normal breath sounds. No wheezing. No rhonchi.   Chest:      Chest wall: Not tender to palpatation.   Cardiovascular:      Normal rate. Regular rhythm.      Murmurs: There is no murmur.      No rub.   Pulses:     Intact distal pulses.   Edema:     Peripheral edema absent.   Abdominal:      General: Bowel sounds are normal.      Palpations: Abdomen is soft.   Musculoskeletal: Normal range of motion.       Cervical back: Neck supple. Skin:     General: Skin is warm and dry.      Comments: Right groin site well approximated, no bruising/bleeding/swelling/redness/pain   Neurological:      Mental Status: Alert and oriented to person, place and time.      Motor: Motor function is intact.         Lab Review:   Lab Results   Component Value Date     04/19/2024    K 4.8 04/19/2024     04/19/2024    CO2 25 04/19/2024    BUN 17 04/19/2024    CREATININE 0.87 04/19/2024    GLUCOSE 96 04/19/2024    CALCIUM 9.3 04/19/2024     Lab Results   Component Value Date    WBC 8.2 04/19/2024    HGB 14.4 04/19/2024    HCT 43.0 04/19/2024    MCV 90 04/19/2024     04/19/2024       Assessment/Plan   The primary encounter diagnosis was PAF (paroxysmal atrial fibrillation) (Multi). A diagnosis of History of CVA (cerebrovascular accident) was also pertinent to this visit.  Patient is 1 month post ablation and is maintaining NSR as far as he can tell.  His right groin site has healed and the bruising has subsided.    We discussed normal symptoms that can happen post ablation and when he should go to the ED or have an office visit.  Patient expresses understanding, all questions asked and answered.  His CHADSVASC score is 4 given his history of stroke.  We will likely recommend indefinite AC, but will have to weigh that with safety if he is still drinking alcohol and a fall risk    Continue current medications  Follow up with me in 2 months or sooner as needed.  We will try to get a loop recorder transmission prior to our next appointment

## 2024-10-11 ENCOUNTER — APPOINTMENT (OUTPATIENT)
Dept: PRIMARY CARE | Facility: CLINIC | Age: 54
End: 2024-10-11
Payer: COMMERCIAL

## 2024-10-11 VITALS
OXYGEN SATURATION: 95 % | HEIGHT: 76 IN | SYSTOLIC BLOOD PRESSURE: 139 MMHG | WEIGHT: 246 LBS | RESPIRATION RATE: 21 BRPM | TEMPERATURE: 98.1 F | DIASTOLIC BLOOD PRESSURE: 85 MMHG | BODY MASS INDEX: 29.96 KG/M2 | HEART RATE: 94 BPM

## 2024-10-11 DIAGNOSIS — I10 HYPERTENSION, UNSPECIFIED TYPE: ICD-10-CM

## 2024-10-11 PROCEDURE — G2211 COMPLEX E/M VISIT ADD ON: HCPCS | Performed by: PEDIATRICS

## 2024-10-11 PROCEDURE — 99213 OFFICE O/P EST LOW 20 MIN: CPT | Performed by: PEDIATRICS

## 2024-10-11 PROCEDURE — 3008F BODY MASS INDEX DOCD: CPT | Performed by: PEDIATRICS

## 2024-10-11 PROCEDURE — 1036F TOBACCO NON-USER: CPT | Performed by: PEDIATRICS

## 2024-10-11 PROCEDURE — 3075F SYST BP GE 130 - 139MM HG: CPT | Performed by: PEDIATRICS

## 2024-10-11 PROCEDURE — 3079F DIAST BP 80-89 MM HG: CPT | Performed by: PEDIATRICS

## 2024-10-11 RX ORDER — LISINOPRIL 30 MG/1
30 TABLET ORAL DAILY
Qty: 100 TABLET | Refills: 0 | Status: SHIPPED | OUTPATIENT
Start: 2024-10-11 | End: 2025-11-15

## 2024-10-11 RX ORDER — LISINOPRIL 20 MG/1
20 TABLET ORAL DAILY
Qty: 90 TABLET | Refills: 1 | Status: CANCELLED | OUTPATIENT
Start: 2024-10-11 | End: 2025-04-09

## 2024-10-11 ASSESSMENT — ENCOUNTER SYMPTOMS: HYPERTENSION: 1

## 2024-10-11 ASSESSMENT — PAIN SCALES - GENERAL: PAINLEVEL: 0-NO PAIN

## 2024-10-11 ASSESSMENT — PATIENT HEALTH QUESTIONNAIRE - PHQ9
2. FEELING DOWN, DEPRESSED OR HOPELESS: NOT AT ALL
1. LITTLE INTEREST OR PLEASURE IN DOING THINGS: NOT AT ALL
SUM OF ALL RESPONSES TO PHQ9 QUESTIONS 1 AND 2: 0

## 2024-10-11 NOTE — PROGRESS NOTES
"Subjective   Patient ID: Roverto Tavera is a 54 y.o. male who presents for Hyperlipidemia, Hypertension, and Prediabetes.    Hyperlipidemia    Hypertension       Had a heart ablation recently.  Colonoscopy due 2029 PSA normal this year  Cholesterol 105  Review of Systems    Objective   /85 (BP Location: Left arm, Patient Position: Sitting, BP Cuff Size: Adult)   Pulse 94   Temp 36.7 °C (98.1 °F) (Temporal)   Resp 21   Ht 1.93 m (6' 4\")   Wt 112 kg (246 lb)   SpO2 95%   BMI 29.94 kg/m²     Physical Exam  Vitals reviewed.   Constitutional:       General: He is not in acute distress.  HENT:      Head: Normocephalic.      Right Ear: Tympanic membrane normal.      Left Ear: Tympanic membrane normal.      Nose: Nose normal.      Mouth/Throat:      Pharynx: Oropharynx is clear.   Cardiovascular:      Rate and Rhythm: Normal rate and regular rhythm.      Heart sounds: Normal heart sounds.   Pulmonary:      Breath sounds: Normal breath sounds.   Abdominal:      Palpations: Abdomen is soft.      Tenderness: There is no abdominal tenderness.   Musculoskeletal:         General: No tenderness.   Skin:     Findings: No rash.   Neurological:      General: No focal deficit present.      Mental Status: He is alert.   Psychiatric:         Mood and Affect: Mood normal.         Assessment/Plan   Problem List Items Addressed This Visit             ICD-10-CM    Hypertension I10    Relevant Medications    lisinopril 30 mg tablet          "

## 2024-12-11 ENCOUNTER — OFFICE VISIT (OUTPATIENT)
Dept: CARDIOLOGY | Facility: CLINIC | Age: 54
End: 2024-12-11
Payer: COMMERCIAL

## 2024-12-11 VITALS
HEART RATE: 72 BPM | RESPIRATION RATE: 18 BRPM | WEIGHT: 243 LBS | BODY MASS INDEX: 29.59 KG/M2 | DIASTOLIC BLOOD PRESSURE: 69 MMHG | OXYGEN SATURATION: 96 % | HEIGHT: 76 IN | SYSTOLIC BLOOD PRESSURE: 117 MMHG

## 2024-12-11 DIAGNOSIS — I48.0 PAROXYSMAL ATRIAL FIBRILLATION (MULTI): ICD-10-CM

## 2024-12-11 LAB
ATRIAL RATE: 76 BPM
P AXIS: 58 DEGREES
P OFFSET: 184 MS
P ONSET: 125 MS
PR INTERVAL: 182 MS
Q ONSET: 216 MS
QRS COUNT: 12 BEATS
QRS DURATION: 110 MS
QT INTERVAL: 378 MS
QTC CALCULATION(BAZETT): 425 MS
QTC FREDERICIA: 408 MS
R AXIS: 34 DEGREES
T AXIS: 4 DEGREES
T OFFSET: 405 MS
VENTRICULAR RATE: 76 BPM

## 2024-12-11 PROCEDURE — 99214 OFFICE O/P EST MOD 30 MIN: CPT | Performed by: NURSE PRACTITIONER

## 2024-12-11 PROCEDURE — 3008F BODY MASS INDEX DOCD: CPT | Performed by: NURSE PRACTITIONER

## 2024-12-11 PROCEDURE — 1036F TOBACCO NON-USER: CPT | Performed by: NURSE PRACTITIONER

## 2024-12-11 PROCEDURE — 93005 ELECTROCARDIOGRAM TRACING: CPT | Performed by: NURSE PRACTITIONER

## 2024-12-11 PROCEDURE — 3074F SYST BP LT 130 MM HG: CPT | Performed by: NURSE PRACTITIONER

## 2024-12-11 PROCEDURE — 3078F DIAST BP <80 MM HG: CPT | Performed by: NURSE PRACTITIONER

## 2024-12-12 ASSESSMENT — ENCOUNTER SYMPTOMS
WEAKNESS: 0
DIARRHEA: 0
SORE THROAT: 0
IRREGULAR HEARTBEAT: 0
DOUBLE VISION: 0
SNORING: 0
PALPITATIONS: 0
ABDOMINAL PAIN: 0
PND: 0
HEMOPTYSIS: 0
DIZZINESS: 0
FALLS: 0
FEVER: 0
NEAR-SYNCOPE: 0
VOMITING: 0
BLURRED VISION: 0
ORTHOPNEA: 0
SPUTUM PRODUCTION: 0
DYSPNEA ON EXERTION: 0
HEADACHES: 0
NAUSEA: 0
SHORTNESS OF BREATH: 0
COUGH: 0
MYALGIAS: 0
LIGHT-HEADEDNESS: 0
SYNCOPE: 0
DIAPHORESIS: 0

## 2024-12-12 NOTE — PROGRESS NOTES
"Subjective   Roverto Tavera is a 54 y.o. male.    Chief Complaint:  Follow-up (2 mo)    Adi Tavera is a 54 y.o. with:      1. HTN, HLD  2. Hx of ongoing ETOH dependence and remote Cocaine abuse. 05/2021 admitted at [facility] for detoxification.   3. Renal Cell Carcinoma - s/p Lt nephrectomy (2004)   4. CAD - elevated CACS (6/2023) Total 162.08   5. CVA - (6/6/21) Rt hemisphere . Subsequently had a loop recorder that showed AF and was started on Eliquis.  ([facility]).   6. Palpitations / Paroxysmal AF - (index dx ? June 2021) s/p loop recorder implant.       TESTING:    -Stress Echo (7/3/23) Resting LVEF 55-60% and 60-65% at peak.  No evidence of ischemia on ECG or imaging.      Now s/p Afib RFA with Dr. Hernandez 9/10/2024  ECG 10/8/2024 NSR HR 85 bpm  ECG 12/11/2024 NSR HR 76 bpm    TODAY Patient present for 3 month follow up post Afib ablation.  He denies any recurrence of his arrhythmia, but he didn't typically have symptoms. We have not gotten a transmission from his device since June 2024.    /69 (BP Location: Right arm)   Pulse 72   Resp 18   Ht 1.93 m (6' 4\")   Wt 110 kg (243 lb)   SpO2 96%   BMI 29.58 kg/m²   Current Outpatient Medications on File Prior to Visit   Medication Sig Dispense Refill    aspirin 81 mg EC tablet Take 1 tablet (81 mg) by mouth once daily. 90 tablet 1    cholecalciferol (Vitamin D-3) 50 MCG (2000 UT) tablet Take 1 tablet (50 mcg) by mouth once daily. 30 tablet 11    Eliquis 5 mg tablet Take 1 tablet (5 mg) by mouth 2 times a day. 180 tablet 3    folic acid (Folvite) 1 mg tablet Take 1 tablet (1 mg) by mouth once daily. 90 tablet 1    lisinopril 30 mg tablet Take 1 tablet (30 mg) by mouth once daily. 100 tablet 0    metoprolol succinate XL (Toprol-XL) 50 mg 24 hr tablet Take 1 tablet (50 mg) by mouth once daily. 90 tablet 1    rosuvastatin (Crestor) 40 mg tablet Take 1 tablet (40 mg) by mouth once daily. 90 tablet 1     No current facility-administered " medications on file prior to visit.         Review of Systems   Constitutional: Negative for diaphoresis, fever and malaise/fatigue.   HENT:  Negative for congestion and sore throat.    Eyes:  Negative for blurred vision and double vision.   Cardiovascular:  Negative for chest pain, dyspnea on exertion, irregular heartbeat, leg swelling, near-syncope, orthopnea, palpitations, paroxysmal nocturnal dyspnea and syncope.   Respiratory:  Negative for cough, hemoptysis, shortness of breath, snoring and sputum production.    Hematologic/Lymphatic: Negative for bleeding problem.   Skin:  Negative for rash.   Musculoskeletal:  Negative for falls, joint pain and myalgias.   Gastrointestinal:  Negative for abdominal pain, diarrhea, nausea and vomiting.   Neurological:  Negative for dizziness, headaches, light-headedness and weakness.   All other systems reviewed and are negative.      Objective   Constitutional:       Appearance: Healthy appearance. Not in distress.   Eyes:      Conjunctiva/sclera: Conjunctivae normal.   HENT:      Nose: Nose normal.    Mouth/Throat:      Pharynx: Oropharynx is clear.   Neck:      Vascular: No JVR. JVD normal.   Pulmonary:      Effort: Pulmonary effort is normal.      Breath sounds: Normal breath sounds. No wheezing. No rhonchi.   Chest:      Chest wall: Not tender to palpatation.   Cardiovascular:      Normal rate. Regular rhythm.      Murmurs: There is no murmur.      No rub.   Pulses:     Intact distal pulses.   Edema:     Peripheral edema absent.   Abdominal:      General: Bowel sounds are normal.      Palpations: Abdomen is soft.   Musculoskeletal: Normal range of motion.      Cervical back: Neck supple. Skin:     General: Skin is warm and dry.   Neurological:      Mental Status: Alert and oriented to person, place and time.      Motor: Motor function is intact.         Lab Review:   Lab Results   Component Value Date     04/19/2024    K 4.8 04/19/2024     04/19/2024    CO2 25  04/19/2024    BUN 17 04/19/2024    CREATININE 0.87 04/19/2024    GLUCOSE 96 04/19/2024    CALCIUM 9.3 04/19/2024     Lab Results   Component Value Date    WBC 8.2 04/19/2024    HGB 14.4 04/19/2024    HCT 43.0 04/19/2024    MCV 90 04/19/2024     04/19/2024       Assessment/Plan   The encounter diagnosis was Paroxysmal atrial fibrillation (Multi).  Patient is 3 months post ablation and is maintaining NSR as far as he can tell.    His CHADSVASC score is 4 given his history of stroke.  We will likely recommend indefinite AC, but will have to weigh that with safety if he is still drinking alcohol and a fall risk    We discussed lifestyle modifications that can help maintain normal sinus rhythm such as exercise, weight loss, managing hypertension, treating sleep apnea, and minimizing alcohol intake.  All questions asked and answered.    Continue Eliquis BID, Continue Metoprolol 50 mg  Follow up with me in 6 months or sooner as needed  Patient is going to try to send a remote transmission from his loop recorder.  Since it's a [employer] device, we cannot check him today in clinic.  Or we will get him set up for a transmission in the near future.

## 2024-12-16 ENCOUNTER — HOSPITAL ENCOUNTER (OUTPATIENT)
Dept: CARDIOLOGY | Facility: CLINIC | Age: 54
Discharge: HOME | End: 2024-12-16
Payer: COMMERCIAL

## 2024-12-16 DIAGNOSIS — I48.91 UNSPECIFIED ATRIAL FIBRILLATION (MULTI): ICD-10-CM

## 2024-12-16 DIAGNOSIS — I48.92 UNSPECIFIED ATRIAL FLUTTER (MULTI): ICD-10-CM

## 2025-01-13 ENCOUNTER — APPOINTMENT (OUTPATIENT)
Dept: PRIMARY CARE | Facility: CLINIC | Age: 55
End: 2025-01-13
Payer: COMMERCIAL

## 2025-01-13 VITALS
SYSTOLIC BLOOD PRESSURE: 111 MMHG | WEIGHT: 250 LBS | DIASTOLIC BLOOD PRESSURE: 73 MMHG | HEART RATE: 76 BPM | HEIGHT: 76 IN | RESPIRATION RATE: 17 BRPM | OXYGEN SATURATION: 95 % | TEMPERATURE: 97.5 F | BODY MASS INDEX: 30.44 KG/M2

## 2025-01-13 DIAGNOSIS — E55.9 VITAMIN D DEFICIENCY: ICD-10-CM

## 2025-01-13 DIAGNOSIS — E66.811 CLASS 1 OBESITY DUE TO EXCESS CALORIES WITH SERIOUS COMORBIDITY AND BODY MASS INDEX (BMI) OF 30.0 TO 30.9 IN ADULT: Primary | ICD-10-CM

## 2025-01-13 DIAGNOSIS — E66.09 CLASS 1 OBESITY DUE TO EXCESS CALORIES WITH SERIOUS COMORBIDITY AND BODY MASS INDEX (BMI) OF 30.0 TO 30.9 IN ADULT: Primary | ICD-10-CM

## 2025-01-13 DIAGNOSIS — D75.89 MACROCYTOSIS: ICD-10-CM

## 2025-01-13 DIAGNOSIS — R73.03 PREDIABETES: ICD-10-CM

## 2025-01-13 DIAGNOSIS — I63.9 CEREBROVASCULAR ACCIDENT (CVA), UNSPECIFIED MECHANISM (MULTI): ICD-10-CM

## 2025-01-13 DIAGNOSIS — Z00.00 HEALTH CARE MAINTENANCE: ICD-10-CM

## 2025-01-13 DIAGNOSIS — E78.5 HYPERLIPIDEMIA, UNSPECIFIED HYPERLIPIDEMIA TYPE: ICD-10-CM

## 2025-01-13 DIAGNOSIS — I10 HYPERTENSION, UNSPECIFIED TYPE: ICD-10-CM

## 2025-01-13 DIAGNOSIS — I48.0 PAROXYSMAL ATRIAL FIBRILLATION (MULTI): ICD-10-CM

## 2025-01-13 DIAGNOSIS — I48.91 ATRIAL FIBRILLATION, UNSPECIFIED TYPE (MULTI): ICD-10-CM

## 2025-01-13 DIAGNOSIS — G89.29 CHRONIC NECK PAIN: ICD-10-CM

## 2025-01-13 DIAGNOSIS — C64.9 MALIGNANT NEOPLASM OF KIDNEY, UNSPECIFIED LATERALITY (MULTI): ICD-10-CM

## 2025-01-13 DIAGNOSIS — M54.2 CHRONIC NECK PAIN: ICD-10-CM

## 2025-01-13 PROBLEM — R06.2 WHEEZING: Status: RESOLVED | Noted: 2024-10-07 | Resolved: 2025-01-13

## 2025-01-13 LAB
ALBUMIN SERPL BCP-MCNC: 4.2 G/DL (ref 3.4–5)
ALP SERPL-CCNC: 88 U/L (ref 45–117)
ALT SERPL W P-5'-P-CCNC: 25 U/L (ref 14–59)
ANION GAP SERPL CALC-SCNC: 14 MMOL/L (ref 10–20)
AST SERPL W P-5'-P-CCNC: 15 U/L (ref 15–37)
BASOPHILS # BLD AUTO: 0.04 X10*3/UL (ref 0.1–1.6)
BASOPHILS NFR BLD AUTO: 0.46 % (ref 0–0.3)
BILIRUB SERPL-MCNC: 0.5 MG/DL (ref 0.2–1)
BUN SERPL-MCNC: 17 MG/DL (ref 7–18)
CALCIUM SERPL-MCNC: 9.8 MG/DL (ref 8.5–10.1)
CHLORIDE SERPL-SCNC: 105 MMOL/L (ref 98–107)
CHOLEST SERPL-MCNC: 110 MG/DL (ref 0–199)
CHOLESTEROL/HDL RATIO: 2.4 (ref 4.2–7)
CO2 SERPL-SCNC: 29 MMOL/L (ref 21–32)
CREAT SERPL-MCNC: 0.99 MG/DL (ref 0.6–1.1)
EGFRCR SERPLBLD CKD-EPI 2021: 89 ML/MIN/1.73M*2
EOSINOPHIL # BLD AUTO: 0.2 X10*3/UL (ref 0.04–0.5)
EOSINOPHIL NFR BLD AUTO: 2.62 % (ref 0.7–7)
ERYTHROCYTE [DISTWIDTH] IN BLOOD BY AUTOMATED COUNT: 13.1 % (ref 11.5–14.5)
GLUCOSE SERPL-MCNC: 101 MG/DL (ref 74–100)
HBA1C MFR BLD: 5.7 %
HCT VFR BLD AUTO: 47.4 % (ref 38.4–51.3)
HDLC SERPL-MCNC: 45 MG/DL (ref 40–59)
HGB BLD-MCNC: 16.24 G/DL (ref 12.7–17)
IS PATIENT FASTING: YES
LDLC SERPL DIRECT ASSAY-MCNC: 45 MG/DL (ref 0–100)
LYMPHOCYTES # BLD AUTO: 1.7 X10*3/UL (ref 0–6)
LYMPHOCYTES NFR BLD AUTO: 21.95 % (ref 20.5–51.1)
MCH RBC QN AUTO: 31.8 PG (ref 26–32)
MCHC RBC AUTO-ENTMCNC: 34.3 G/DL (ref 31–38)
MCV RBC AUTO: 92.9 FL (ref 80–96)
MONOCYTES # BLD AUTO: 0.38 X10*3/UL (ref 1.6–24.9)
MONOCYTES NFR BLD AUTO: 4.92 % (ref 1.7–9.3)
NEUTROPHILS # BLD AUTO: 5.43 X10*3/UL (ref 1.4–6.5)
NEUTROPHILS NFR BLD AUTO: 70.05 % (ref 42.2–75.2)
PLATELET # BLD AUTO: 203.4 X10*3/UL (ref 150–450)
PMV BLD AUTO: 8.65 FL (ref 7.8–11)
POTASSIUM SERPL-SCNC: 5.5 MMOL/L (ref 3.5–5.1)
PROT SERPL-MCNC: 7.7 G/DL (ref 6.4–8.2)
RBC # BLD AUTO: 5.1 X10*6/UL (ref 4.1–5.6)
SODIUM SERPL-SCNC: 142 MMOL/L (ref 136–145)
TRIGL SERPL-MCNC: 86 MG/DL
WBC # BLD AUTO: 7.75 X10*3/UL (ref 4.5–10.5)

## 2025-01-13 PROCEDURE — 80053 COMPREHEN METABOLIC PANEL: CPT | Performed by: PEDIATRICS

## 2025-01-13 PROCEDURE — 3008F BODY MASS INDEX DOCD: CPT | Performed by: PEDIATRICS

## 2025-01-13 PROCEDURE — 3078F DIAST BP <80 MM HG: CPT | Performed by: PEDIATRICS

## 2025-01-13 PROCEDURE — 3074F SYST BP LT 130 MM HG: CPT | Performed by: PEDIATRICS

## 2025-01-13 PROCEDURE — G2211 COMPLEX E/M VISIT ADD ON: HCPCS | Performed by: PEDIATRICS

## 2025-01-13 PROCEDURE — 83036 HEMOGLOBIN GLYCOSYLATED A1C: CPT | Performed by: PEDIATRICS

## 2025-01-13 PROCEDURE — 1036F TOBACCO NON-USER: CPT | Performed by: PEDIATRICS

## 2025-01-13 PROCEDURE — 85025 COMPLETE CBC W/AUTO DIFF WBC: CPT | Performed by: PEDIATRICS

## 2025-01-13 PROCEDURE — 80061 LIPID PANEL: CPT | Performed by: PEDIATRICS

## 2025-01-13 PROCEDURE — 99214 OFFICE O/P EST MOD 30 MIN: CPT | Performed by: PEDIATRICS

## 2025-01-13 RX ORDER — ASPIRIN 81 MG/1
81 TABLET ORAL DAILY
Qty: 90 TABLET | Refills: 1 | Status: SHIPPED | OUTPATIENT
Start: 2025-01-13

## 2025-01-13 RX ORDER — LISINOPRIL 30 MG/1
30 TABLET ORAL DAILY
Qty: 100 TABLET | Refills: 0 | Status: SHIPPED | OUTPATIENT
Start: 2025-01-13 | End: 2026-02-17

## 2025-01-13 RX ORDER — FOLIC ACID 1 MG/1
1 TABLET ORAL DAILY
Qty: 90 TABLET | Refills: 1 | Status: SHIPPED | OUTPATIENT
Start: 2025-01-13

## 2025-01-13 RX ORDER — CHOLECALCIFEROL (VITAMIN D3) 50 MCG
50 TABLET ORAL DAILY
Qty: 30 TABLET | Refills: 11 | Status: SHIPPED | OUTPATIENT
Start: 2025-01-13 | End: 2026-01-13

## 2025-01-13 RX ORDER — APIXABAN 5 MG/1
5 TABLET, FILM COATED ORAL 2 TIMES DAILY
Qty: 180 TABLET | Refills: 3 | Status: SHIPPED | OUTPATIENT
Start: 2025-01-13 | End: 2026-01-13

## 2025-01-13 RX ORDER — ROSUVASTATIN CALCIUM 40 MG/1
40 TABLET, COATED ORAL DAILY
Qty: 90 TABLET | Refills: 1 | Status: SHIPPED | OUTPATIENT
Start: 2025-01-13

## 2025-01-13 RX ORDER — METOPROLOL SUCCINATE 50 MG/1
50 TABLET, EXTENDED RELEASE ORAL DAILY
Qty: 90 TABLET | Refills: 1 | Status: SHIPPED | OUTPATIENT
Start: 2025-01-13

## 2025-01-13 ASSESSMENT — PATIENT HEALTH QUESTIONNAIRE - PHQ9
SUM OF ALL RESPONSES TO PHQ9 QUESTIONS 1 AND 2: 0
2. FEELING DOWN, DEPRESSED OR HOPELESS: NOT AT ALL
1. LITTLE INTEREST OR PLEASURE IN DOING THINGS: NOT AT ALL

## 2025-01-13 ASSESSMENT — PAIN SCALES - GENERAL: PAINLEVEL_OUTOF10: 0-NO PAIN

## 2025-01-13 NOTE — PROGRESS NOTES
"Subjective   Patient ID: Roverto Tavera is a 54 y.o. male who presents for Hyperlipidemia, Hypertension, and Prediabetes.    HPI   Denies alcohol and smoking for 2 years  PSA done in April.  Colonoscopy due 2029  Review of Systems  No palpitations  Objective   /73 (BP Location: Right arm, Patient Position: Sitting, BP Cuff Size: Adult)   Pulse 76   Temp 36.4 °C (97.5 °F) (Temporal)   Resp 17   Ht 1.93 m (6' 4\")   Wt 113 kg (250 lb)   SpO2 95%   BMI 30.43 kg/m²     Physical Exam  HEENT neg  Lungs clear  Heart RRR  Abd soft  SLR neg  Assessment/Plan   Problem List Items Addressed This Visit             ICD-10-CM    Paroxysmal atrial fibrillation (Multi) I48.0    Relevant Medications    Eliquis 5 mg tablet    metoprolol succinate XL (Toprol-XL) 50 mg 24 hr tablet    Other Relevant Orders    CBC w/5 Part Differential, [facility]    CVA (cerebral vascular accident) (Multi) I63.9     Left hand still feels a bit works; slight facial droop persists         Hyperlipidemia E78.5    Relevant Medications    rosuvastatin (Crestor) 40 mg tablet    Other Relevant Orders    CT lung screening low dose    Lipid Panel    Comprehensive Metabolic Panel    Hypertension I10    Relevant Medications    lisinopril 30 mg tablet    Class 1 obesity due to excess calories with serious comorbidity and body mass index (BMI) of 30.0 to 30.9 in adult - Primary E66.811, E66.09, Z68.30     Eats out only once a week; loves chips and chicken wings         Vitamin D deficiency E55.9    Relevant Medications    cholecalciferol (Vitamin D-3) 50 MCG (2000 UT) tablet    Prediabetes R73.03    Relevant Orders    Hemoglobin A1C    Chronic neck pain M54.2, G89.29     Much improved         Macrocytosis D75.89    Malignant neoplasm of kidney (Multi) C64.9     Other Visit Diagnoses         Codes    Atrial fibrillation, unspecified type (Multi)     I48.91    Relevant Medications    Eliquis 5 mg tablet    metoprolol succinate XL (Toprol-XL) 50 mg 24 hr " tablet    Health care maintenance     Z00.00    Relevant Medications    folic acid (Folvite) 1 mg tablet    aspirin 81 mg EC tablet

## 2025-01-14 DIAGNOSIS — E87.5 HYPERKALEMIA: Primary | ICD-10-CM

## 2025-01-15 ENCOUNTER — TELEPHONE (OUTPATIENT)
Dept: PRIMARY CARE | Facility: CLINIC | Age: 55
End: 2025-01-15
Payer: COMMERCIAL

## 2025-01-15 NOTE — TELEPHONE ENCOUNTER
----- Message from Kimberly Rodriguez sent at 1/14/2025 11:46 PM EST -----  Potassium high-->repeat potassium at a local  lab to confirm; other labs ok but still prediabetic-->follow diabetic diet

## 2025-01-15 NOTE — TELEPHONE ENCOUNTER
I notified Pt. Adi Tavera   of Dr. Rodriguez message. Pt. expressed understanding of the message given.

## 2025-04-15 ENCOUNTER — OFFICE VISIT (OUTPATIENT)
Dept: CARDIOLOGY | Facility: CLINIC | Age: 55
End: 2025-04-15
Payer: COMMERCIAL

## 2025-04-15 VITALS
SYSTOLIC BLOOD PRESSURE: 118 MMHG | BODY MASS INDEX: 30.63 KG/M2 | DIASTOLIC BLOOD PRESSURE: 81 MMHG | HEIGHT: 76 IN | OXYGEN SATURATION: 100 % | HEART RATE: 84 BPM | WEIGHT: 251.5 LBS

## 2025-04-15 DIAGNOSIS — I48.0 PAROXYSMAL ATRIAL FIBRILLATION (MULTI): Primary | ICD-10-CM

## 2025-04-15 DIAGNOSIS — I10 HYPERTENSION, UNSPECIFIED TYPE: ICD-10-CM

## 2025-04-15 DIAGNOSIS — I25.10 ARTERIOSCLEROSIS OF CORONARY ARTERY: ICD-10-CM

## 2025-04-15 PROCEDURE — 99214 OFFICE O/P EST MOD 30 MIN: CPT | Performed by: NURSE PRACTITIONER

## 2025-04-15 PROCEDURE — 3008F BODY MASS INDEX DOCD: CPT | Performed by: NURSE PRACTITIONER

## 2025-04-15 PROCEDURE — 1036F TOBACCO NON-USER: CPT | Performed by: NURSE PRACTITIONER

## 2025-04-15 PROCEDURE — 3079F DIAST BP 80-89 MM HG: CPT | Performed by: NURSE PRACTITIONER

## 2025-04-15 PROCEDURE — 3074F SYST BP LT 130 MM HG: CPT | Performed by: NURSE PRACTITIONER

## 2025-04-15 RX ORDER — LISINOPRIL 30 MG/1
30 TABLET ORAL DAILY
Qty: 90 TABLET | Refills: 3 | Status: SHIPPED | OUTPATIENT
Start: 2025-04-15 | End: 2025-04-18 | Stop reason: SDUPTHER

## 2025-04-15 ASSESSMENT — COLUMBIA-SUICIDE SEVERITY RATING SCALE - C-SSRS
1. IN THE PAST MONTH, HAVE YOU WISHED YOU WERE DEAD OR WISHED YOU COULD GO TO SLEEP AND NOT WAKE UP?: NO
2. HAVE YOU ACTUALLY HAD ANY THOUGHTS OF KILLING YOURSELF?: NO
6. HAVE YOU EVER DONE ANYTHING, STARTED TO DO ANYTHING, OR PREPARED TO DO ANYTHING TO END YOUR LIFE?: NO

## 2025-04-15 ASSESSMENT — ENCOUNTER SYMPTOMS
MUSCULOSKELETAL NEGATIVE: 1
GASTROINTESTINAL NEGATIVE: 1
RESPIRATORY NEGATIVE: 1
NEUROLOGICAL NEGATIVE: 1
DEPRESSION: 0
LOSS OF SENSATION IN FEET: 0
CONSTITUTIONAL NEGATIVE: 1
CARDIOVASCULAR NEGATIVE: 1
OCCASIONAL FEELINGS OF UNSTEADINESS: 0

## 2025-04-15 ASSESSMENT — PAIN SCALES - GENERAL: PAINLEVEL_OUTOF10: 0-NO PAIN

## 2025-04-15 NOTE — PROGRESS NOTES
"Chief Complaint:   Follow-up    History Of Present Illness:    .Mr Tavera returns in follow up.  Denies chest pain, sob, palpitations or pedal edema.                Last Recorded Vitals:  Blood pressure 118/81, pulse 84, height 1.93 m (6' 4\"), weight 114 kg (251 lb 8 oz), SpO2 100%.     Past Medical History:  Past Medical History:   Diagnosis Date    Abnormal findings on diagnostic imaging of other specified body structures 05/22/2021    Abnormal chest xray    Diverticulitis of intestine, part unspecified, without perforation or abscess without bleeding 03/23/2021    Acute diverticulitis    Malignant neoplasm of unspecified kidney, except renal pelvis (Multi) 05/29/2013    Malignant neoplasm of kidney    Personal history of other diseases of the respiratory system 05/22/2021    History of interstitial lung disease    Personal history of other malignant neoplasm of kidney 08/18/2017    History of renal cell carcinoma    Personal history of other mental and behavioral disorders 07/12/2021    History of anxiety        Past Surgical History:  Past Surgical History:   Procedure Laterality Date    CARDIAC ELECTROPHYSIOLOGY PROCEDURE N/A 9/10/2024    Procedure: Ablation A-Fib Paroxysmal;  Surgeon: Praneeth Hernandez MD;  Location: [place] [address] [facility]iac [facility] Lab;  Service: Electrophysiology;  Laterality: N/A;  needs EPS, 3D with CARTO and RFA    FOOT SURGERY  05/30/2013    Foot Surgery    KIDNEY SURGERY  05/30/2013    Kidney Surgery    NECK SURGERY  05/30/2013    Neck Surgery       Social History:  Social History     Socioeconomic History    Marital status: Single   Tobacco Use    Smoking status: Former     Types: Cigarettes    Smokeless tobacco: Never   Substance and Sexual Activity    Alcohol use: Not Currently    Drug use: Never     Social Drivers of Health     Financial Resource Strain: Not on File (11/17/2023)    Received from [employer]    Financial Resource Strain     Financial Resource Strain: 0   Food Insecurity: Not on " File (2024)    Received from [facility]    Food Insecurity     Food: 0   Transportation Needs: Not on File (2023)    Received from [facility]    Transportation Needs     Transportation: 0   Physical Activity: Not on File (2023)    [facility]    Physical Activity     Physical Activity: 0   Stress: Not on File (2023)    Received from [facility]    Stress     Stress: 0   Social Connections: Not on File (2023)    Received from [facility]    Social Connections     Connectedness: 0   Housing Stability: Not on File (2023)    Received from [facility]    Housing Stability     [place]ousin       Family History:  No family history on file.      Allergies:  Patient has no known allergies.    Outpatient Medications:  Current Outpatient Medications   Medication Sig Dispense Refill    aspirin 81 mg EC tablet Take 1 tablet (81 mg) by mouth once daily. 90 tablet 1    cholecalciferol (Vitamin D-3) 50 MCG (2000 UT) tablet Take 1 tablet (50 mcg) by mouth once daily. 30 tablet 11    Eliquis 5 mg tablet Take 1 tablet (5 mg) by mouth 2 times a day. 180 tablet 3    folic acid (Folvite) 1 mg tablet Take 1 tablet (1 mg) by mouth once daily. 90 tablet 1    lisinopril 30 mg tablet Take 1 tablet (30 mg) by mouth once daily. 100 tablet 0    metoprolol succinate XL (Toprol-XL) 50 mg 24 hr tablet Take 1 tablet (50 mg) by mouth once daily. 90 tablet 1    rosuvastatin (Crestor) 40 mg tablet Take 1 tablet (40 mg) by mouth once daily. 90 tablet 1     No current facility-administered medications for this visit.        Physical Exam:  Cardiovascular:      PMI at left midclavicular line. Normal rate. Regular rhythm. Normal S1. Normal S2.       Murmurs: There is no murmur.      No gallop.  No click. No rub.   Pulses:     Intact distal pulses.   Edema:     Peripheral edema absent.         ROS:  Review of Systems   Constitutional: Negative.   Cardiovascular: Negative.    Respiratory: Negative.     Skin: Negative.    Musculoskeletal:  Negative.    Gastrointestinal: Negative.    Genitourinary: Negative.    Neurological: Negative.           Last Labs: reviewed  CBC -  Lab Results   Component Value Date    WBC 7.75 01/13/2025    HGB 16.24 01/13/2025    HCT 47.4 01/13/2025    MCV 92.9 01/13/2025    .4 01/13/2025       CMP -  Lab Results   Component Value Date    CALCIUM 9.8 01/13/2025    PROT 7.7 01/13/2025    ALBUMIN 4.2 01/13/2025    AST 15 01/13/2025    ALT 25 01/13/2025    ALKPHOS 88 01/13/2025    BILITOT 0.5 01/13/2025       LIPID PANEL -   Lab Results   Component Value Date    CHOL 110 01/13/2025    TRIG 86 01/13/2025    HDL 45.0 01/13/2025    CHHDL 2.4 (L) 01/13/2025    LDLF 86 01/30/2023    VLDL 19 04/19/2024    NHDL 58 04/19/2024       RENAL FUNCTION PANEL -   Lab Results   Component Value Date    GLUCOSE 101 (H) 01/13/2025     01/13/2025    K 5.5 (H) 01/13/2025     01/13/2025    CO2 29 01/13/2025    ANIONGAP 14 01/13/2025    BUN 17 01/13/2025    CREATININE 0.99 01/13/2025    GFRMALE 89 01/30/2023    CALCIUM 9.8 01/13/2025    ALBUMIN 4.2 01/13/2025        Lab Results   Component Value Date    HGBA1C 5.7 (H) 01/13/2025         Assessment/Plan   Problem List Items Addressed This Visit    None    Assessment:     1. Status post right hemispheric CVA. The patient is a 51-year-old white male with a history of longstanding hypertension hyperlipidemia chronic smoking of 1-1-1/2 packs/day with remote substance abuse including marijuana and cocaine. He also has a history of ongoing alcohol abuse. The patient was actually admitted to [facility] and 5/2021 for detoxification. The patient subsequently was referred to a detoxification facility at [facility]. The patient was in the midst of the program when he developed a right hemispheric CVA manifested by a left facial droop slurred speech and the paresthesias of the left-sided extremities that occurred on 6/6/2021. The patient was admitted for a period of 1 week to the [facility]. Reportedly he was identified as having paroxysmal atrial fibrillation. Loop recorder was implanted and he was started on Eliquis 5 mg twice daily. After his 1 week hospitalization he was sent to the [facility] for rehabilitation and ultimately was released on 6/21/2021. The patient presents for initial neurologic cardiology follow-up. His EKG today demonstrates sinus rhythm with moderate voltage criteria for left we will attempt to obtain the records from the [facility] the patient is presently taking clonidine only 0.1 mg daily. And will be discontinued in favor of Toprol-XL 50 mg daily. He will also be advised to restart lisinopril 10 mg daily for hypertension and will be aware of the fact that the patient solitary kidney. The patient will be reminded to restart previously prescribed atorvastatin 40 mg daily. Apparently patient has a loop recorder without current monitoring.  Will refer to EP to re institute monitoring.  Patient was found to have 8% afib burden on the monitor and has had a RFA done 09/10/2024. Now has a functioning loop recorder.  Last checked 12/2024.  No afib.     2. Paroxysmal atrial fibrillation. See discussion above. Obtain records from [facility].     3. Eliquis anticoagulation.     4. Hypertension. Blood pressure is acceptable today. We will continue observation now on lisinopril 10 mg daily Toprol-XL 50 mg daily     5. Hyperlipidemia. Was on atorvastatin 40 mg daily. Now on rosuvastatin 40 mg daily.  Chol 110 HDL 45 LDL 45 trig 86.     6. History of chronic smoking. Quit.     7 history of alcohol abuse. Quit.     8. Status post left-sided nephrectomy for renal cell carcinoma 2004.     9. CAD. Coronary artery calcium score done 06/2023 was 162.08.  Stress echo done 07/2023  Summary:  1. The resting ejection fraction was estimated at 55 to 60% with a peak exercise ejection fraction estimated at 60 to 65%.  2. No clinical,  electrocardiographic or echocardiographic evidence for ischemia at a maximal workload.  3. Submaximal level of stress achieved.         Callie Ortega, APRN-CNP

## 2025-04-17 DIAGNOSIS — I48.0 PAROXYSMAL ATRIAL FIBRILLATION (MULTI): ICD-10-CM

## 2025-04-17 DIAGNOSIS — E78.5 HYPERLIPIDEMIA, UNSPECIFIED HYPERLIPIDEMIA TYPE: ICD-10-CM

## 2025-04-17 DIAGNOSIS — Z00.00 HEALTH CARE MAINTENANCE: ICD-10-CM

## 2025-04-18 DIAGNOSIS — I10 HYPERTENSION, UNSPECIFIED TYPE: ICD-10-CM

## 2025-04-18 RX ORDER — LISINOPRIL 30 MG/1
30 TABLET ORAL DAILY
Qty: 90 TABLET | Refills: 3 | Status: SHIPPED | OUTPATIENT
Start: 2025-04-18 | End: 2026-04-18

## 2025-04-21 RX ORDER — ROSUVASTATIN CALCIUM 40 MG/1
40 TABLET, COATED ORAL DAILY
Qty: 90 TABLET | Refills: 0 | Status: SHIPPED | OUTPATIENT
Start: 2025-04-21

## 2025-04-21 RX ORDER — FOLIC ACID 1 MG/1
1 TABLET ORAL DAILY
Qty: 90 TABLET | Refills: 0 | Status: SHIPPED | OUTPATIENT
Start: 2025-04-21

## 2025-04-21 RX ORDER — ASPIRIN 81 MG/1
81 TABLET ORAL DAILY
Qty: 90 TABLET | Refills: 0 | Status: SHIPPED | OUTPATIENT
Start: 2025-04-21

## 2025-04-21 RX ORDER — METOPROLOL SUCCINATE 50 MG/1
50 TABLET, EXTENDED RELEASE ORAL DAILY
Qty: 90 TABLET | Refills: 0 | Status: SHIPPED | OUTPATIENT
Start: 2025-04-21

## 2025-04-25 ENCOUNTER — HOSPITAL ENCOUNTER (OUTPATIENT)
Dept: RADIOLOGY | Facility: CLINIC | Age: 55
Discharge: HOME | End: 2025-04-25
Payer: COMMERCIAL

## 2025-04-25 DIAGNOSIS — E78.5 HYPERLIPIDEMIA, UNSPECIFIED HYPERLIPIDEMIA TYPE: ICD-10-CM

## 2025-04-25 PROCEDURE — 71271 CT THORAX LUNG CANCER SCR C-: CPT

## 2025-06-11 ENCOUNTER — APPOINTMENT (OUTPATIENT)
Dept: CARDIOLOGY | Facility: CLINIC | Age: 55
End: 2025-06-11
Payer: COMMERCIAL

## 2025-07-09 ENCOUNTER — OFFICE VISIT (OUTPATIENT)
Dept: CARDIOLOGY | Facility: CLINIC | Age: 55
End: 2025-07-09
Payer: COMMERCIAL

## 2025-07-09 VITALS
BODY MASS INDEX: 29.94 KG/M2 | SYSTOLIC BLOOD PRESSURE: 137 MMHG | OXYGEN SATURATION: 97 % | HEART RATE: 69 BPM | WEIGHT: 246 LBS | DIASTOLIC BLOOD PRESSURE: 80 MMHG

## 2025-07-09 DIAGNOSIS — I48.0 PAROXYSMAL ATRIAL FIBRILLATION (MULTI): Primary | ICD-10-CM

## 2025-07-09 LAB
ATRIAL RATE: 69 BPM
P AXIS: 33 DEGREES
P OFFSET: 182 MS
P ONSET: 127 MS
PR INTERVAL: 180 MS
Q ONSET: 217 MS
QRS COUNT: 12 BEATS
QRS DURATION: 100 MS
QT INTERVAL: 392 MS
QTC CALCULATION(BAZETT): 420 MS
QTC FREDERICIA: 410 MS
R AXIS: 14 DEGREES
T AXIS: 5 DEGREES
T OFFSET: 413 MS
VENTRICULAR RATE: 69 BPM

## 2025-07-09 PROCEDURE — 99214 OFFICE O/P EST MOD 30 MIN: CPT | Performed by: NURSE PRACTITIONER

## 2025-07-09 PROCEDURE — 3075F SYST BP GE 130 - 139MM HG: CPT | Performed by: NURSE PRACTITIONER

## 2025-07-09 PROCEDURE — 93005 ELECTROCARDIOGRAM TRACING: CPT | Performed by: NURSE PRACTITIONER

## 2025-07-09 PROCEDURE — 99212 OFFICE O/P EST SF 10 MIN: CPT

## 2025-07-09 PROCEDURE — 3079F DIAST BP 80-89 MM HG: CPT | Performed by: NURSE PRACTITIONER

## 2025-07-09 PROCEDURE — 1036F TOBACCO NON-USER: CPT | Performed by: NURSE PRACTITIONER

## 2025-07-09 ASSESSMENT — ENCOUNTER SYMPTOMS
VOMITING: 0
SYNCOPE: 0
NAUSEA: 0
ABDOMINAL PAIN: 0
FEVER: 0
SHORTNESS OF BREATH: 0
BLURRED VISION: 0
DOUBLE VISION: 0
WEAKNESS: 0
PALPITATIONS: 0
HEMOPTYSIS: 0
SORE THROAT: 0
HEADACHES: 0
MYALGIAS: 0
NEAR-SYNCOPE: 0
DIZZINESS: 0
COUGH: 0
SPUTUM PRODUCTION: 0
LIGHT-HEADEDNESS: 0
DEPRESSION: 0
DIAPHORESIS: 0
PND: 0
ORTHOPNEA: 0
LOSS OF SENSATION IN FEET: 0
SNORING: 0
DYSPNEA ON EXERTION: 0
FALLS: 0
DIARRHEA: 0
OCCASIONAL FEELINGS OF UNSTEADINESS: 0
IRREGULAR HEARTBEAT: 0

## 2025-07-09 NOTE — PROGRESS NOTES
Subjective   Roverto Tavera is a 55 y.o. male.    Chief Complaint:  Follow-up    Adi Tavera is a 55 y.o. with:      1. HTN, HLD  2. Hx of ETOH dependence and remote Cocaine abuse. 05/2021 admitted at [facility] for detoxification.   3. Renal Cell Carcinoma - s/p Lt nephrectomy (2004)   4. CAD - elevated CACS (6/2023) Total 162.08   5. CVA - (6/6/21) Rt hemisphere . Subsequently had a loop recorder that showed AF and was started on Eliquis.  ([facility]).   6. Palpitations / Paroxysmal AF - (index dx ? June 2021) s/p loop recorder implant.       TESTING:    -Stress Echo (7/3/23) Resting LVEF 55-60% and 60-65% at peak.  No evidence of ischemia on ECG or imaging.      Now s/p Afib RFA with Dr. Hernandez 9/10/2024  ECG 10/8/2024 NSR HR 85 bpm  ECG 12/11/2024 NSR HR 76 bpm  ECG 7/9/2025 NSR HR 69 bpm    TODAY Patient is now 6 months post ablation. He is feeling good and denies any further arrhythmia symptoms, however he did not previously have symptoms.  Patient has an ILR, but the battery has now run out. He stopped drinking alcohol and smoking and overall feels great. He remains on anticoagulation and denies any bleeding issues.    /80   Pulse 69   Wt 112 kg (246 lb)   SpO2 97%   BMI 29.94 kg/m²   Current Outpatient Medications on File Prior to Visit   Medication Sig Dispense Refill    aspirin 81 mg EC tablet Take 1 tablet (81 mg) by mouth once daily. 90 tablet 0    cholecalciferol (Vitamin D-3) 50 MCG (2000 UT) tablet Take 1 tablet (50 mcg) by mouth once daily. 30 tablet 11    Eliquis 5 mg tablet Take 1 tablet (5 mg) by mouth 2 times a day. 180 tablet 3    folic acid (Folvite) 1 mg tablet Take 1 tablet (1 mg) by mouth once daily. 90 tablet 0    lisinopril 30 mg tablet Take 1 tablet (30 mg) by mouth once daily. 90 tablet 3    metoprolol succinate XL (Toprol-XL) 50 mg 24 hr tablet Take 1 tablet (50 mg) by mouth once daily. 90 tablet 0    rosuvastatin (Crestor) 40 mg tablet Take 1 tablet (40 mg) by  mouth once daily. 90 tablet 0     No current facility-administered medications on file prior to visit.         Review of Systems   Constitutional: Negative for diaphoresis, fever and malaise/fatigue.   HENT:  Negative for congestion and sore throat.    Eyes:  Negative for blurred vision and double vision.   Cardiovascular:  Negative for chest pain, dyspnea on exertion, irregular heartbeat, leg swelling, near-syncope, orthopnea, palpitations, paroxysmal nocturnal dyspnea and syncope.   Respiratory:  Negative for cough, hemoptysis, shortness of breath, snoring and sputum production.    Hematologic/Lymphatic: Negative for bleeding problem.   Skin:  Negative for rash.   Musculoskeletal:  Negative for falls, joint pain and myalgias.   Gastrointestinal:  Negative for abdominal pain, diarrhea, nausea and vomiting.   Neurological:  Negative for dizziness, headaches, light-headedness and weakness.   All other systems reviewed and are negative.      Objective   Constitutional:       Appearance: Healthy appearance. Not in distress.   Eyes:      Conjunctiva/sclera: Conjunctivae normal.   HENT:      Nose: Nose normal.    Mouth/Throat:      Pharynx: Oropharynx is clear.   Neck:      Vascular: No JVR. JVD normal.   Pulmonary:      Effort: Pulmonary effort is normal.      Breath sounds: Normal breath sounds. No wheezing. No rhonchi.   Chest:      Chest wall: Not tender to palpatation.   Cardiovascular:      Normal rate. Regular rhythm.      Murmurs: There is no murmur.      No rub.   Pulses:     Intact distal pulses.   Edema:     Peripheral edema absent.   Abdominal:      General: Bowel sounds are normal.      Palpations: Abdomen is soft.   Musculoskeletal: Normal range of motion.      Cervical back: Neck supple. Skin:     General: Skin is warm and dry.   Neurological:      Mental Status: Alert and oriented to person, place and time.      Motor: Motor function is intact.       Lab Review:   Lab Results   Component Value Date    NA  142 01/13/2025    K 5.5 (H) 01/13/2025     01/13/2025    CO2 29 01/13/2025    BUN 17 01/13/2025    CREATININE 0.99 01/13/2025    GLUCOSE 101 (H) 01/13/2025    CALCIUM 9.8 01/13/2025     Lab Results   Component Value Date    WBC 7.75 01/13/2025    HGB 16.24 01/13/2025    HCT 47.4 01/13/2025    MCV 92.9 01/13/2025    .4 01/13/2025     I personally reviewed the patient's latest PCP and general cardiology.  I personally reviewed the patient's latest ECG, echocardiogram and stress test.  Results are within normal limits      Assessment/Plan   The encounter diagnosis was Paroxysmal atrial fibrillation (Multi).  Patient is 6 months post ablation and is maintaining NSR as far as he can tell.    His CHADSVASC score is 4 given his history of stroke.  We recommend indefinite AC.    We discussed lifestyle modifications that can help maintain normal sinus rhythm such as exercise, weight loss, managing hypertension, treating sleep apnea, and minimizing alcohol intake.  All questions asked and answered.    Continue Eliquis BID, Continue Metoprolol 50 mg  Patient can follow up with general cardiology as scheduled and EP in the office as needed

## 2025-07-14 ENCOUNTER — APPOINTMENT (OUTPATIENT)
Dept: PRIMARY CARE | Facility: CLINIC | Age: 55
End: 2025-07-14
Payer: COMMERCIAL

## 2025-07-14 VITALS
HEIGHT: 76 IN | BODY MASS INDEX: 29.47 KG/M2 | DIASTOLIC BLOOD PRESSURE: 74 MMHG | HEART RATE: 70 BPM | SYSTOLIC BLOOD PRESSURE: 126 MMHG | RESPIRATION RATE: 17 BRPM | TEMPERATURE: 98.4 F | OXYGEN SATURATION: 96 % | WEIGHT: 242 LBS

## 2025-07-14 DIAGNOSIS — E78.5 HYPERLIPIDEMIA, UNSPECIFIED HYPERLIPIDEMIA TYPE: ICD-10-CM

## 2025-07-14 DIAGNOSIS — I48.0 PAROXYSMAL ATRIAL FIBRILLATION (MULTI): ICD-10-CM

## 2025-07-14 DIAGNOSIS — I48.91 ATRIAL FIBRILLATION, UNSPECIFIED TYPE (MULTI): ICD-10-CM

## 2025-07-14 DIAGNOSIS — I10 HYPERTENSION, UNSPECIFIED TYPE: Primary | ICD-10-CM

## 2025-07-14 DIAGNOSIS — E55.9 VITAMIN D DEFICIENCY: ICD-10-CM

## 2025-07-14 DIAGNOSIS — Z12.5 SCREENING PSA (PROSTATE SPECIFIC ANTIGEN): ICD-10-CM

## 2025-07-14 DIAGNOSIS — Z11.59 NEED FOR HEPATITIS C SCREENING TEST: ICD-10-CM

## 2025-07-14 DIAGNOSIS — Z00.00 HEALTH CARE MAINTENANCE: ICD-10-CM

## 2025-07-14 PROBLEM — F10.21 ALCOHOL DEPENDENCE, IN REMISSION: Chronic | Status: RESOLVED | Noted: 2023-11-16 | Resolved: 2025-07-14

## 2025-07-14 PROBLEM — J84.9 INTERSTITIAL LUNG DISEASE (MULTI): Status: RESOLVED | Noted: 2024-10-07 | Resolved: 2025-07-14

## 2025-07-14 LAB
25(OH)D3 SERPL-MCNC: 64 NG/ML (ref 30–100)
ANION GAP SERPL CALC-SCNC: 12 MMOL/L (ref 10–20)
BUN SERPL-MCNC: 24 MG/DL (ref 7–18)
CALCIUM SERPL-MCNC: 10 MG/DL (ref 8.5–10.1)
CHLORIDE SERPL-SCNC: 100 MMOL/L (ref 98–107)
CO2 SERPL-SCNC: 30 MMOL/L (ref 21–32)
CREAT SERPL-MCNC: 0.95 MG/DL (ref 0.6–1.1)
EGFRCR SERPLBLD CKD-EPI 2021: >90 ML/MIN/1.73M*2
GLUCOSE SERPL-MCNC: 113 MG/DL (ref 74–100)
POTASSIUM SERPL-SCNC: 5.6 MMOL/L (ref 3.5–5.1)
PSA SERPL-MCNC: 4.36 NG/ML
SODIUM SERPL-SCNC: 136 MMOL/L (ref 136–145)

## 2025-07-14 PROCEDURE — 3074F SYST BP LT 130 MM HG: CPT | Performed by: PEDIATRICS

## 2025-07-14 PROCEDURE — 3078F DIAST BP <80 MM HG: CPT | Performed by: PEDIATRICS

## 2025-07-14 PROCEDURE — 80048 BASIC METABOLIC PNL TOTAL CA: CPT | Performed by: PEDIATRICS

## 2025-07-14 PROCEDURE — 1036F TOBACCO NON-USER: CPT | Performed by: PEDIATRICS

## 2025-07-14 PROCEDURE — 82306 VITAMIN D 25 HYDROXY: CPT | Performed by: PEDIATRICS

## 2025-07-14 PROCEDURE — 3008F BODY MASS INDEX DOCD: CPT | Performed by: PEDIATRICS

## 2025-07-14 PROCEDURE — G0103 PSA SCREENING: HCPCS | Performed by: PEDIATRICS

## 2025-07-14 PROCEDURE — 99214 OFFICE O/P EST MOD 30 MIN: CPT | Performed by: PEDIATRICS

## 2025-07-14 RX ORDER — ROSUVASTATIN CALCIUM 40 MG/1
40 TABLET, COATED ORAL DAILY
Qty: 90 TABLET | Refills: 1 | Status: SHIPPED | OUTPATIENT
Start: 2025-07-14

## 2025-07-14 RX ORDER — METOPROLOL SUCCINATE 50 MG/1
50 TABLET, EXTENDED RELEASE ORAL DAILY
Qty: 90 TABLET | Refills: 1 | Status: SHIPPED | OUTPATIENT
Start: 2025-07-14

## 2025-07-14 RX ORDER — LISINOPRIL 30 MG/1
30 TABLET ORAL DAILY
Qty: 90 TABLET | Refills: 3 | Status: SHIPPED | OUTPATIENT
Start: 2025-07-14 | End: 2026-07-14

## 2025-07-14 RX ORDER — APIXABAN 5 MG/1
5 TABLET, FILM COATED ORAL 2 TIMES DAILY
Qty: 180 TABLET | Refills: 3 | Status: SHIPPED | OUTPATIENT
Start: 2025-07-14 | End: 2026-07-14

## 2025-07-14 RX ORDER — CHOLECALCIFEROL (VITAMIN D3) 50 MCG
50 TABLET ORAL DAILY
Qty: 30 TABLET | Refills: 11 | Status: SHIPPED | OUTPATIENT
Start: 2025-07-14 | End: 2026-07-14

## 2025-07-14 RX ORDER — ASPIRIN 81 MG/1
81 TABLET ORAL DAILY
Qty: 90 TABLET | Refills: 1 | Status: SHIPPED | OUTPATIENT
Start: 2025-07-14

## 2025-07-14 RX ORDER — FOLIC ACID 1 MG/1
1 TABLET ORAL DAILY
Qty: 90 TABLET | Refills: 1 | Status: SHIPPED | OUTPATIENT
Start: 2025-07-14

## 2025-07-14 ASSESSMENT — PAIN SCALES - GENERAL: PAINLEVEL_OUTOF10: 0-NO PAIN

## 2025-07-14 ASSESSMENT — ENCOUNTER SYMPTOMS: HYPERTENSION: 1

## 2025-07-14 NOTE — PROGRESS NOTES
"Subjective   Patient ID: Roverto Tavera is a 55 y.o. male who presents for HTN.    Hyperlipidemia    Hypertension       Patient is feeling well; still no smoking or alcohol; gave up pop and candy  Colonoscopy due 2029  Low dose CT due April  Review of Systems    Objective   /74 (BP Location: Right arm, Patient Position: Sitting, BP Cuff Size: Large adult)   Pulse 70   Temp 36.9 °C (98.4 °F) (Temporal)   Resp 17   Ht 1.93 m (6' 4\")   Wt 110 kg (242 lb)   SpO2 96%   BMI 29.46 kg/m²     Physical Exam  Vitals reviewed.   Constitutional:       General: He is not in acute distress.  HENT:      Head: Normocephalic.      Right Ear: Tympanic membrane normal.      Left Ear: Tympanic membrane normal.      Nose: Nose normal.      Mouth/Throat:      Pharynx: Oropharynx is clear.   Cardiovascular:      Rate and Rhythm: Normal rate and regular rhythm.      Heart sounds: Normal heart sounds.   Pulmonary:      Breath sounds: Normal breath sounds.   Abdominal:      Palpations: Abdomen is soft.      Tenderness: There is no abdominal tenderness.   Musculoskeletal:         General: No tenderness.   Skin:     Findings: No rash.      Comments: Sunburn face   Neurological:      General: No focal deficit present.      Mental Status: He is alert.   Psychiatric:         Mood and Affect: Mood normal.         Assessment/Plan   Problem List Items Addressed This Visit           ICD-10-CM    Paroxysmal atrial fibrillation (Multi) I48.0    Relevant Medications    Eliquis 5 mg tablet    metoprolol succinate XL (Toprol-XL) 50 mg 24 hr tablet    Hyperlipidemia E78.5    Relevant Medications    cholecalciferol (Vitamin D-3) 50 mcg (2,000 units) tablet    rosuvastatin (Crestor) 40 mg tablet    folic acid (Folvite) 1 mg tablet    Hypertension - Primary I10    Relevant Medications    lisinopril 30 mg tablet    Other Relevant Orders    Basic Metabolic Panel    Vitamin D deficiency E55.9    Relevant Medications    cholecalciferol (Vitamin D-3) 50 " mcg (2,000 units) tablet    Other Relevant Orders    Vitamin D 25-Hydroxy,Total (for eval of Vitamin D levels)     Other Visit Diagnoses         Codes      Health care maintenance     Z00.00    Relevant Medications    aspirin 81 mg EC tablet    folic acid (Folvite) 1 mg tablet      Atrial fibrillation, unspecified type (Multi)     I48.91    Relevant Medications    Eliquis 5 mg tablet    metoprolol succinate XL (Toprol-XL) 50 mg 24 hr tablet      Screening PSA (prostate specific antigen)     Z12.5    Relevant Orders    Prostate Specific Antigen      Need for hepatitis C screening test     Z11.59    Relevant Orders    Hepatitis C Antibody

## 2025-07-15 LAB — HCV AB SERPL QL IA: NORMAL

## 2025-07-16 ENCOUNTER — RESULTS FOLLOW-UP (OUTPATIENT)
Dept: PRIMARY CARE | Facility: CLINIC | Age: 55
End: 2025-07-16
Payer: COMMERCIAL

## 2025-07-16 DIAGNOSIS — R97.20 ELEVATED PSA: Primary | ICD-10-CM

## 2025-07-16 DIAGNOSIS — E87.5 HYPERKALEMIA: ICD-10-CM

## 2025-07-16 NOTE — TELEPHONE ENCOUNTER
----- Message from Kimberly Rodriguez sent at 7/16/2025  7:33 AM EDT -----  Please see urologist about high PSA; please get potassium repeated at another  lab because it came back high for us.  Sugar is prediabetic-->needs to follow a diabetic diet-->please mail to him if he does not have one.  ----- Message -----  From: Lab, Background User  Sent: 7/14/2025   1:26 PM EDT  To: Kimberly Rodriguez MD

## 2025-07-16 NOTE — TELEPHONE ENCOUNTER
I notified Pt. Adi Tavera of Dr. Rodriguez message. Pt. expressed understanding of the message given. Mailed diabetic diet to address listed in chart.

## 2025-08-12 ENCOUNTER — APPOINTMENT (OUTPATIENT)
Dept: UROLOGY | Facility: HOSPITAL | Age: 55
End: 2025-08-12
Payer: COMMERCIAL

## 2025-08-20 ENCOUNTER — APPOINTMENT (OUTPATIENT)
Dept: UROLOGY | Facility: HOSPITAL | Age: 55
End: 2025-08-20
Payer: COMMERCIAL

## 2025-09-10 ENCOUNTER — APPOINTMENT (OUTPATIENT)
Dept: UROLOGY | Facility: HOSPITAL | Age: 55
End: 2025-09-10
Payer: COMMERCIAL

## 2025-10-21 ENCOUNTER — APPOINTMENT (OUTPATIENT)
Dept: CARDIOLOGY | Facility: CLINIC | Age: 55
End: 2025-10-21
Payer: COMMERCIAL

## 2026-01-14 ENCOUNTER — APPOINTMENT (OUTPATIENT)
Dept: PRIMARY CARE | Facility: CLINIC | Age: 56
End: 2026-01-14
Payer: COMMERCIAL

## (undated) DEVICE — CATHETER, THERMOCOOL SMART TOUCH, SF, D-F CURVE

## (undated) DEVICE — NEEDLE, NRG TRANSSEPTAL, 98CM, CURVE C0

## (undated) DEVICE — CLOSURE SYSTEM, VASCULAR, MVP 6-12FR, VENOUS

## (undated) DEVICE — SHEATH, STEERABLE, SUREFLEX, MEDIUM CURVE

## (undated) DEVICE — INTRODUCER, HEMOSTASIS, STR/J .038 IN, 8.5FR 12CM

## (undated) DEVICE — CABLE, CARTO 3 SYSTEM, ECO INTERFACE, 34-PIN, SPLIT HANDLE (REPROCESSED)

## (undated) DEVICE — TUBING SET, IRRIGATION, SMARTABLATE

## (undated) DEVICE — CABLE, 34 HYP, 34 LEMO, 10FT, SMART TOUCH

## (undated) DEVICE — CATHETHER, CS, BI-DIRECTIONAL, 10 POLES, D-F TYPE

## (undated) DEVICE — CATHETER, DIAGNOSTIC, SOUNDSTAR ECO SMS, 8FR

## (undated) DEVICE — PAD, ELECTRODE DEFIB PADPRO ADULT STRL W/ADAPTER

## (undated) DEVICE — CABLE, CONNECTOR, 10FT

## (undated) DEVICE — PATCHES, EXTERNAL REFERENCE, CARTO3

## (undated) DEVICE — INTRODUCER, SHEATH, FAST-CATH, 8FR X 12CM, C-LOCK

## (undated) DEVICE — CATHETER, PENTARAY, NAV ECO, 7FR, 2-6-2 SPACING, D CURVE

## (undated) DEVICE — INTERFACE CABLE, EXISITING DX CATHETERS, BLUE PORT (REPROCESS)